# Patient Record
Sex: FEMALE | Race: WHITE | Employment: OTHER | URBAN - METROPOLITAN AREA
[De-identification: names, ages, dates, MRNs, and addresses within clinical notes are randomized per-mention and may not be internally consistent; named-entity substitution may affect disease eponyms.]

---

## 2017-01-19 DIAGNOSIS — I10 ESSENTIAL HYPERTENSION, BENIGN: ICD-10-CM

## 2017-01-19 RX ORDER — FUROSEMIDE 20 MG/1
TABLET ORAL
Qty: 30 TABLET | Refills: 5 | Status: SHIPPED | OUTPATIENT
Start: 2017-01-19 | End: 2017-09-12 | Stop reason: SDUPTHER

## 2017-02-01 ENCOUNTER — OFFICE VISIT (OUTPATIENT)
Dept: CARDIOLOGY CLINIC | Age: 77
End: 2017-02-01

## 2017-02-01 VITALS
SYSTOLIC BLOOD PRESSURE: 134 MMHG | WEIGHT: 81 LBS | DIASTOLIC BLOOD PRESSURE: 62 MMHG | HEIGHT: 64 IN | BODY MASS INDEX: 13.83 KG/M2 | HEART RATE: 64 BPM

## 2017-02-01 DIAGNOSIS — Z86.79 HX OF CARDIOMYOPATHY: ICD-10-CM

## 2017-02-01 DIAGNOSIS — I10 ESSENTIAL HYPERTENSION, BENIGN: ICD-10-CM

## 2017-02-01 DIAGNOSIS — I73.9 PAD (PERIPHERAL ARTERY DISEASE) (HCC): Primary | ICD-10-CM

## 2017-02-01 DIAGNOSIS — I70.1 BILATERAL RENAL ARTERY STENOSIS (HCC): ICD-10-CM

## 2017-02-01 DIAGNOSIS — R09.89 BILATERAL CAROTID BRUITS: ICD-10-CM

## 2017-02-01 PROCEDURE — 1090F PRES/ABSN URINE INCON ASSESS: CPT | Performed by: INTERNAL MEDICINE

## 2017-02-01 PROCEDURE — G8427 DOCREV CUR MEDS BY ELIG CLIN: HCPCS | Performed by: INTERNAL MEDICINE

## 2017-02-01 PROCEDURE — G8400 PT W/DXA NO RESULTS DOC: HCPCS | Performed by: INTERNAL MEDICINE

## 2017-02-01 PROCEDURE — G8419 CALC BMI OUT NRM PARAM NOF/U: HCPCS | Performed by: INTERNAL MEDICINE

## 2017-02-01 PROCEDURE — 4040F PNEUMOC VAC/ADMIN/RCVD: CPT | Performed by: INTERNAL MEDICINE

## 2017-02-01 PROCEDURE — G8598 ASA/ANTIPLAT THER USED: HCPCS | Performed by: INTERNAL MEDICINE

## 2017-02-01 PROCEDURE — 1123F ACP DISCUSS/DSCN MKR DOCD: CPT | Performed by: INTERNAL MEDICINE

## 2017-02-01 PROCEDURE — G8484 FLU IMMUNIZE NO ADMIN: HCPCS | Performed by: INTERNAL MEDICINE

## 2017-02-01 PROCEDURE — 99214 OFFICE O/P EST MOD 30 MIN: CPT | Performed by: INTERNAL MEDICINE

## 2017-02-01 PROCEDURE — 3017F COLORECTAL CA SCREEN DOC REV: CPT | Performed by: INTERNAL MEDICINE

## 2017-02-01 PROCEDURE — 1036F TOBACCO NON-USER: CPT | Performed by: INTERNAL MEDICINE

## 2017-02-01 PROCEDURE — 3014F SCREEN MAMMO DOC REV: CPT | Performed by: INTERNAL MEDICINE

## 2017-02-16 ENCOUNTER — OFFICE VISIT (OUTPATIENT)
Dept: FAMILY MEDICINE CLINIC | Age: 77
End: 2017-02-16

## 2017-02-16 VITALS
HEART RATE: 63 BPM | HEIGHT: 64 IN | DIASTOLIC BLOOD PRESSURE: 63 MMHG | SYSTOLIC BLOOD PRESSURE: 134 MMHG | BODY MASS INDEX: 13.66 KG/M2 | WEIGHT: 80 LBS

## 2017-02-16 DIAGNOSIS — R63.4 WEIGHT LOSS: ICD-10-CM

## 2017-02-16 DIAGNOSIS — I25.10 CORONARY ARTERY DISEASE INVOLVING NATIVE CORONARY ARTERY OF NATIVE HEART WITHOUT ANGINA PECTORIS: ICD-10-CM

## 2017-02-16 DIAGNOSIS — I25.5 ISCHEMIC CARDIOMYOPATHY: ICD-10-CM

## 2017-02-16 DIAGNOSIS — I10 ESSENTIAL HYPERTENSION, BENIGN: ICD-10-CM

## 2017-02-16 DIAGNOSIS — J42 CHRONIC BRONCHITIS, UNSPECIFIED CHRONIC BRONCHITIS TYPE (HCC): ICD-10-CM

## 2017-02-16 DIAGNOSIS — C34.12 MALIGNANT NEOPLASM OF UPPER LOBE OF LEFT LUNG (HCC): Primary | ICD-10-CM

## 2017-02-16 DIAGNOSIS — I73.9 PAD (PERIPHERAL ARTERY DISEASE) (HCC): ICD-10-CM

## 2017-02-16 LAB
A/G RATIO: 2 (ref 1.1–2.2)
ALBUMIN SERPL-MCNC: 4.1 G/DL (ref 3.4–5)
ALP BLD-CCNC: 65 U/L (ref 40–129)
ALT SERPL-CCNC: 36 U/L (ref 10–40)
ANION GAP SERPL CALCULATED.3IONS-SCNC: 14 MMOL/L (ref 3–16)
AST SERPL-CCNC: 37 U/L (ref 15–37)
BASOPHILS ABSOLUTE: 0 K/UL (ref 0–0.2)
BASOPHILS RELATIVE PERCENT: 0.6 %
BILIRUB SERPL-MCNC: 0.4 MG/DL (ref 0–1)
BUN BLDV-MCNC: 20 MG/DL (ref 7–20)
CALCIUM SERPL-MCNC: 9 MG/DL (ref 8.3–10.6)
CHLORIDE BLD-SCNC: 102 MMOL/L (ref 99–110)
CHOLESTEROL, TOTAL: 121 MG/DL (ref 0–199)
CO2: 26 MMOL/L (ref 21–32)
CREAT SERPL-MCNC: 0.9 MG/DL (ref 0.6–1.2)
EOSINOPHILS ABSOLUTE: 0.1 K/UL (ref 0–0.6)
EOSINOPHILS RELATIVE PERCENT: 1.6 %
GFR AFRICAN AMERICAN: >60
GFR NON-AFRICAN AMERICAN: >60
GLOBULIN: 2.1 G/DL
GLUCOSE BLD-MCNC: 87 MG/DL (ref 70–99)
HCT VFR BLD CALC: 34.1 % (ref 36–48)
HDLC SERPL-MCNC: 59 MG/DL (ref 40–60)
HEMOGLOBIN: 11 G/DL (ref 12–16)
LDL CHOLESTEROL CALCULATED: 53 MG/DL
LYMPHOCYTES ABSOLUTE: 0.8 K/UL (ref 1–5.1)
LYMPHOCYTES RELATIVE PERCENT: 13.1 %
MCH RBC QN AUTO: 30.3 PG (ref 26–34)
MCHC RBC AUTO-ENTMCNC: 32.4 G/DL (ref 31–36)
MCV RBC AUTO: 93.6 FL (ref 80–100)
MONOCYTES ABSOLUTE: 0.5 K/UL (ref 0–1.3)
MONOCYTES RELATIVE PERCENT: 8.3 %
NEUTROPHILS ABSOLUTE: 4.5 K/UL (ref 1.7–7.7)
NEUTROPHILS RELATIVE PERCENT: 76.4 %
PDW BLD-RTO: 15.8 % (ref 12.4–15.4)
PLATELET # BLD: 199 K/UL (ref 135–450)
PMV BLD AUTO: 7.3 FL (ref 5–10.5)
POTASSIUM SERPL-SCNC: 3.9 MMOL/L (ref 3.5–5.1)
RBC # BLD: 3.64 M/UL (ref 4–5.2)
SODIUM BLD-SCNC: 142 MMOL/L (ref 136–145)
TOTAL PROTEIN: 6.2 G/DL (ref 6.4–8.2)
TRIGL SERPL-MCNC: 46 MG/DL (ref 0–150)
VLDLC SERPL CALC-MCNC: 9 MG/DL
WBC # BLD: 5.9 K/UL (ref 4–11)

## 2017-02-16 PROCEDURE — G8400 PT W/DXA NO RESULTS DOC: HCPCS | Performed by: FAMILY MEDICINE

## 2017-02-16 PROCEDURE — G8926 SPIRO NO PERF OR DOC: HCPCS | Performed by: FAMILY MEDICINE

## 2017-02-16 PROCEDURE — 3014F SCREEN MAMMO DOC REV: CPT | Performed by: FAMILY MEDICINE

## 2017-02-16 PROCEDURE — G8419 CALC BMI OUT NRM PARAM NOF/U: HCPCS | Performed by: FAMILY MEDICINE

## 2017-02-16 PROCEDURE — G8427 DOCREV CUR MEDS BY ELIG CLIN: HCPCS | Performed by: FAMILY MEDICINE

## 2017-02-16 PROCEDURE — 3017F COLORECTAL CA SCREEN DOC REV: CPT | Performed by: FAMILY MEDICINE

## 2017-02-16 PROCEDURE — 1090F PRES/ABSN URINE INCON ASSESS: CPT | Performed by: FAMILY MEDICINE

## 2017-02-16 PROCEDURE — 1036F TOBACCO NON-USER: CPT | Performed by: FAMILY MEDICINE

## 2017-02-16 PROCEDURE — G8598 ASA/ANTIPLAT THER USED: HCPCS | Performed by: FAMILY MEDICINE

## 2017-02-16 PROCEDURE — 99215 OFFICE O/P EST HI 40 MIN: CPT | Performed by: FAMILY MEDICINE

## 2017-02-16 PROCEDURE — 3023F SPIROM DOC REV: CPT | Performed by: FAMILY MEDICINE

## 2017-02-16 PROCEDURE — 36415 COLL VENOUS BLD VENIPUNCTURE: CPT | Performed by: FAMILY MEDICINE

## 2017-02-16 PROCEDURE — 4040F PNEUMOC VAC/ADMIN/RCVD: CPT | Performed by: FAMILY MEDICINE

## 2017-02-16 PROCEDURE — G8484 FLU IMMUNIZE NO ADMIN: HCPCS | Performed by: FAMILY MEDICINE

## 2017-02-16 PROCEDURE — 1123F ACP DISCUSS/DSCN MKR DOCD: CPT | Performed by: FAMILY MEDICINE

## 2017-02-17 ENCOUNTER — TELEPHONE (OUTPATIENT)
Dept: CARDIOLOGY CLINIC | Age: 77
End: 2017-02-17

## 2017-03-06 ENCOUNTER — HOSPITAL ENCOUNTER (OUTPATIENT)
Dept: CT IMAGING | Age: 77
Discharge: HOME | End: 2017-03-06
Attending: INTERNAL MEDICINE | Admitting: INTERNAL MEDICINE

## 2017-03-06 DIAGNOSIS — C34.12 MALIGNANT NEOPLASM OF UPPER LOBE, LEFT BRONCHUS OR LUNG (HCC): ICD-10-CM

## 2017-03-06 DIAGNOSIS — C34.12 MALIGNANT NEOPLASM OF UPPER LOBE OF LEFT LUNG (HCC): ICD-10-CM

## 2017-03-20 RX ORDER — CLOPIDOGREL BISULFATE 75 MG/1
TABLET ORAL
Qty: 30 TABLET | Refills: 5 | Status: SHIPPED | OUTPATIENT
Start: 2017-03-20 | End: 2017-09-12 | Stop reason: SDUPTHER

## 2017-05-16 ENCOUNTER — OFFICE VISIT (OUTPATIENT)
Dept: FAMILY MEDICINE CLINIC | Age: 77
End: 2017-05-16

## 2017-05-16 VITALS
WEIGHT: 80.4 LBS | BODY MASS INDEX: 14.25 KG/M2 | SYSTOLIC BLOOD PRESSURE: 110 MMHG | HEIGHT: 63 IN | RESPIRATION RATE: 20 BRPM | HEART RATE: 68 BPM | DIASTOLIC BLOOD PRESSURE: 52 MMHG

## 2017-05-16 DIAGNOSIS — H35.62 MACULAR HEMORRHAGE OF LEFT EYE: ICD-10-CM

## 2017-05-16 DIAGNOSIS — I10 ESSENTIAL HYPERTENSION, BENIGN: ICD-10-CM

## 2017-05-16 DIAGNOSIS — J42 CHRONIC BRONCHITIS, UNSPECIFIED CHRONIC BRONCHITIS TYPE (HCC): ICD-10-CM

## 2017-05-16 DIAGNOSIS — C34.12 MALIGNANT NEOPLASM OF UPPER LOBE OF LEFT LUNG (HCC): Primary | ICD-10-CM

## 2017-05-16 DIAGNOSIS — I73.9 PERIPHERAL VASCULAR DISEASE (HCC): ICD-10-CM

## 2017-05-16 DIAGNOSIS — I25.10 CORONARY ARTERY DISEASE INVOLVING NATIVE CORONARY ARTERY OF NATIVE HEART WITHOUT ANGINA PECTORIS: ICD-10-CM

## 2017-05-16 PROCEDURE — 4040F PNEUMOC VAC/ADMIN/RCVD: CPT | Performed by: FAMILY MEDICINE

## 2017-05-16 PROCEDURE — 1123F ACP DISCUSS/DSCN MKR DOCD: CPT | Performed by: FAMILY MEDICINE

## 2017-05-16 PROCEDURE — 3023F SPIROM DOC REV: CPT | Performed by: FAMILY MEDICINE

## 2017-05-16 PROCEDURE — 3017F COLORECTAL CA SCREEN DOC REV: CPT | Performed by: FAMILY MEDICINE

## 2017-05-16 PROCEDURE — G8427 DOCREV CUR MEDS BY ELIG CLIN: HCPCS | Performed by: FAMILY MEDICINE

## 2017-05-16 PROCEDURE — 3014F SCREEN MAMMO DOC REV: CPT | Performed by: FAMILY MEDICINE

## 2017-05-16 PROCEDURE — G8598 ASA/ANTIPLAT THER USED: HCPCS | Performed by: FAMILY MEDICINE

## 2017-05-16 PROCEDURE — 1036F TOBACCO NON-USER: CPT | Performed by: FAMILY MEDICINE

## 2017-05-16 PROCEDURE — 1090F PRES/ABSN URINE INCON ASSESS: CPT | Performed by: FAMILY MEDICINE

## 2017-05-16 PROCEDURE — G8926 SPIRO NO PERF OR DOC: HCPCS | Performed by: FAMILY MEDICINE

## 2017-05-16 PROCEDURE — 99215 OFFICE O/P EST HI 40 MIN: CPT | Performed by: FAMILY MEDICINE

## 2017-05-16 PROCEDURE — G8419 CALC BMI OUT NRM PARAM NOF/U: HCPCS | Performed by: FAMILY MEDICINE

## 2017-05-16 PROCEDURE — G8400 PT W/DXA NO RESULTS DOC: HCPCS | Performed by: FAMILY MEDICINE

## 2017-06-05 ENCOUNTER — HOSPITAL ENCOUNTER (OUTPATIENT)
Dept: CT IMAGING | Age: 77
Discharge: HOME | End: 2017-06-05
Attending: INTERNAL MEDICINE | Admitting: INTERNAL MEDICINE

## 2017-06-05 DIAGNOSIS — C34.12 MALIGNANT NEOPLASM OF UPPER LOBE, LEFT BRONCHUS OR LUNG (HCC): ICD-10-CM

## 2017-06-05 DIAGNOSIS — C34.12 MALIGNANT NEOPLASM OF UPPER LOBE OF LEFT LUNG (HCC): ICD-10-CM

## 2017-06-05 LAB
GFR AFRICAN AMERICAN: >60
GFR NON-AFRICAN AMERICAN: >60
PERFORMED ON: NORMAL
POC CREATININE: 0.9 MG/DL (ref 0.6–1.2)
POC SAMPLE TYPE: NORMAL

## 2017-06-08 RX ORDER — POTASSIUM CHLORIDE 20 MEQ/1
TABLET, EXTENDED RELEASE ORAL
Qty: 90 TABLET | Refills: 3 | Status: SHIPPED | OUTPATIENT
Start: 2017-06-08 | End: 2017-09-12 | Stop reason: SDUPTHER

## 2017-06-20 ENCOUNTER — OFFICE VISIT (OUTPATIENT)
Dept: FAMILY MEDICINE CLINIC | Age: 77
End: 2017-06-20

## 2017-06-20 VITALS
DIASTOLIC BLOOD PRESSURE: 60 MMHG | HEART RATE: 74 BPM | HEIGHT: 63 IN | WEIGHT: 79 LBS | SYSTOLIC BLOOD PRESSURE: 172 MMHG | BODY MASS INDEX: 14 KG/M2

## 2017-06-20 DIAGNOSIS — I10 ESSENTIAL HYPERTENSION, BENIGN: ICD-10-CM

## 2017-06-20 DIAGNOSIS — C34.12 MALIGNANT NEOPLASM OF UPPER LOBE OF LEFT LUNG (HCC): ICD-10-CM

## 2017-06-20 DIAGNOSIS — R53.1 WEAKNESS: ICD-10-CM

## 2017-06-20 DIAGNOSIS — S30.1XXA HEMATOMA OF LEFT FLANK, INITIAL ENCOUNTER: Primary | ICD-10-CM
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
## 2017-06-20 LAB
BASOPHILS ABSOLUTE: 0 K/UL (ref 0–0.2)
BASOPHILS RELATIVE PERCENT: 0.4 %
EOSINOPHILS ABSOLUTE: 0.1 K/UL (ref 0–0.6)
EOSINOPHILS RELATIVE PERCENT: 1.4 %
HCT VFR BLD CALC: 30.3 % (ref 36–48)
HEMOGLOBIN: 10 G/DL (ref 12–16)
LYMPHOCYTES ABSOLUTE: 1.1 K/UL (ref 1–5.1)
LYMPHOCYTES RELATIVE PERCENT: 11.8 %
MCH RBC QN AUTO: 29 PG (ref 26–34)
MCHC RBC AUTO-ENTMCNC: 32.8 G/DL (ref 31–36)
MCV RBC AUTO: 88.4 FL (ref 80–100)
MONOCYTES ABSOLUTE: 0.9 K/UL (ref 0–1.3)
MONOCYTES RELATIVE PERCENT: 10.3 %
NEUTROPHILS ABSOLUTE: 6.9 K/UL (ref 1.7–7.7)
NEUTROPHILS RELATIVE PERCENT: 76.1 %
PDW BLD-RTO: 15.5 % (ref 12.4–15.4)
PLATELET # BLD: 229 K/UL (ref 135–450)
PMV BLD AUTO: 7.1 FL (ref 5–10.5)
RBC # BLD: 3.43 M/UL (ref 4–5.2)
WBC # BLD: 9 K/UL (ref 4–11)

## 2017-06-20 PROCEDURE — 1036F TOBACCO NON-USER: CPT | Performed by: FAMILY MEDICINE

## 2017-06-20 PROCEDURE — 3014F SCREEN MAMMO DOC REV: CPT | Performed by: FAMILY MEDICINE

## 2017-06-20 PROCEDURE — G8598 ASA/ANTIPLAT THER USED: HCPCS | Performed by: FAMILY MEDICINE

## 2017-06-20 PROCEDURE — 36415 COLL VENOUS BLD VENIPUNCTURE: CPT | Performed by: FAMILY MEDICINE

## 2017-06-20 PROCEDURE — 3017F COLORECTAL CA SCREEN DOC REV: CPT | Performed by: FAMILY MEDICINE

## 2017-06-20 PROCEDURE — G8427 DOCREV CUR MEDS BY ELIG CLIN: HCPCS | Performed by: FAMILY MEDICINE

## 2017-06-20 PROCEDURE — 1123F ACP DISCUSS/DSCN MKR DOCD: CPT | Performed by: FAMILY MEDICINE

## 2017-06-20 PROCEDURE — 4040F PNEUMOC VAC/ADMIN/RCVD: CPT | Performed by: FAMILY MEDICINE

## 2017-06-20 PROCEDURE — G8400 PT W/DXA NO RESULTS DOC: HCPCS | Performed by: FAMILY MEDICINE

## 2017-06-20 PROCEDURE — 1090F PRES/ABSN URINE INCON ASSESS: CPT | Performed by: FAMILY MEDICINE

## 2017-06-20 PROCEDURE — G8419 CALC BMI OUT NRM PARAM NOF/U: HCPCS | Performed by: FAMILY MEDICINE

## 2017-06-20 PROCEDURE — 99214 OFFICE O/P EST MOD 30 MIN: CPT | Performed by: FAMILY MEDICINE

## 2017-06-20 RX ORDER — HYDROCODONE BITARTRATE AND ACETAMINOPHEN 5; 325 MG/1; MG/1
1 TABLET ORAL EVERY 4 HOURS PRN
Qty: 30 TABLET | Refills: 0 | Status: SHIPPED | OUTPATIENT
Start: 2017-06-20 | End: 2017-06-27

## 2017-06-21 ENCOUNTER — TELEPHONE (OUTPATIENT)
Dept: FAMILY MEDICINE CLINIC | Age: 77
End: 2017-06-21

## 2017-06-21 RX ORDER — ONDANSETRON 4 MG/1
4 TABLET, FILM COATED ORAL EVERY 8 HOURS PRN
Qty: 20 TABLET | Refills: 0 | Status: CANCELLED | OUTPATIENT
Start: 2017-06-21 | End: 2018-06-21

## 2017-06-27 ENCOUNTER — OFFICE VISIT (OUTPATIENT)
Dept: FAMILY MEDICINE CLINIC | Age: 77
End: 2017-06-27

## 2017-06-27 VITALS
BODY MASS INDEX: 14 KG/M2 | HEART RATE: 68 BPM | SYSTOLIC BLOOD PRESSURE: 93 MMHG | HEIGHT: 63 IN | WEIGHT: 79 LBS | DIASTOLIC BLOOD PRESSURE: 51 MMHG

## 2017-06-27 DIAGNOSIS — I25.10 CORONARY ARTERY DISEASE INVOLVING NATIVE CORONARY ARTERY OF NATIVE HEART WITHOUT ANGINA PECTORIS: ICD-10-CM

## 2017-06-27 DIAGNOSIS — J42 CHRONIC BRONCHITIS, UNSPECIFIED CHRONIC BRONCHITIS TYPE (HCC): ICD-10-CM

## 2017-06-27 DIAGNOSIS — S30.1XXA HEMATOMA OF LEFT FLANK, INITIAL ENCOUNTER: Primary | ICD-10-CM

## 2017-06-27 DIAGNOSIS — I73.9 PERIPHERAL VASCULAR DISEASE (HCC): ICD-10-CM

## 2017-06-27 PROCEDURE — 99214 OFFICE O/P EST MOD 30 MIN: CPT | Performed by: FAMILY MEDICINE

## 2017-06-27 PROCEDURE — G8427 DOCREV CUR MEDS BY ELIG CLIN: HCPCS | Performed by: FAMILY MEDICINE

## 2017-06-27 PROCEDURE — 1036F TOBACCO NON-USER: CPT | Performed by: FAMILY MEDICINE

## 2017-06-27 PROCEDURE — 4040F PNEUMOC VAC/ADMIN/RCVD: CPT | Performed by: FAMILY MEDICINE

## 2017-06-27 PROCEDURE — G8598 ASA/ANTIPLAT THER USED: HCPCS | Performed by: FAMILY MEDICINE

## 2017-06-27 PROCEDURE — 3017F COLORECTAL CA SCREEN DOC REV: CPT | Performed by: FAMILY MEDICINE

## 2017-06-27 PROCEDURE — G8419 CALC BMI OUT NRM PARAM NOF/U: HCPCS | Performed by: FAMILY MEDICINE

## 2017-06-27 PROCEDURE — G8400 PT W/DXA NO RESULTS DOC: HCPCS | Performed by: FAMILY MEDICINE

## 2017-06-27 PROCEDURE — 1090F PRES/ABSN URINE INCON ASSESS: CPT | Performed by: FAMILY MEDICINE

## 2017-06-27 PROCEDURE — 3014F SCREEN MAMMO DOC REV: CPT | Performed by: FAMILY MEDICINE

## 2017-06-27 PROCEDURE — 1123F ACP DISCUSS/DSCN MKR DOCD: CPT | Performed by: FAMILY MEDICINE

## 2017-06-27 PROCEDURE — 3023F SPIROM DOC REV: CPT | Performed by: FAMILY MEDICINE

## 2017-06-27 PROCEDURE — G8926 SPIRO NO PERF OR DOC: HCPCS | Performed by: FAMILY MEDICINE

## 2017-07-13 ENCOUNTER — CARE COORDINATION (OUTPATIENT)
Dept: CARE COORDINATION | Age: 77
End: 2017-07-13

## 2017-07-14 ENCOUNTER — OFFICE VISIT (OUTPATIENT)
Dept: FAMILY MEDICINE CLINIC | Age: 77
End: 2017-07-14

## 2017-07-14 VITALS
HEART RATE: 72 BPM | OXYGEN SATURATION: 82 % | HEIGHT: 61 IN | SYSTOLIC BLOOD PRESSURE: 136 MMHG | RESPIRATION RATE: 14 BRPM | DIASTOLIC BLOOD PRESSURE: 50 MMHG | TEMPERATURE: 98.7 F | WEIGHT: 77 LBS | BODY MASS INDEX: 14.54 KG/M2

## 2017-07-14 DIAGNOSIS — R26.81 UNSTEADY GAIT: ICD-10-CM

## 2017-07-14 DIAGNOSIS — J96.11 CHRONIC RESPIRATORY FAILURE WITH HYPOXIA (HCC): ICD-10-CM

## 2017-07-14 DIAGNOSIS — R29.898 WEAKNESS OF RIGHT LEG: ICD-10-CM

## 2017-07-14 DIAGNOSIS — J18.9 PNEUMONIA OF LEFT UPPER LOBE DUE TO INFECTIOUS ORGANISM: Primary | ICD-10-CM

## 2017-07-14 DIAGNOSIS — R29.898 WEAKNESS OF LEFT LEG: ICD-10-CM

## 2017-07-14 DIAGNOSIS — C34.92 SMALL CELL LUNG CANCER, LEFT (HCC): ICD-10-CM

## 2017-07-14 DIAGNOSIS — J42 CHRONIC BRONCHITIS, UNSPECIFIED CHRONIC BRONCHITIS TYPE (HCC): ICD-10-CM

## 2017-07-14 PROCEDURE — 4040F PNEUMOC VAC/ADMIN/RCVD: CPT | Performed by: FAMILY MEDICINE

## 2017-07-14 PROCEDURE — 3017F COLORECTAL CA SCREEN DOC REV: CPT | Performed by: FAMILY MEDICINE

## 2017-07-14 PROCEDURE — G8598 ASA/ANTIPLAT THER USED: HCPCS | Performed by: FAMILY MEDICINE

## 2017-07-14 PROCEDURE — G8427 DOCREV CUR MEDS BY ELIG CLIN: HCPCS | Performed by: FAMILY MEDICINE

## 2017-07-14 PROCEDURE — 3014F SCREEN MAMMO DOC REV: CPT | Performed by: FAMILY MEDICINE

## 2017-07-14 PROCEDURE — G8926 SPIRO NO PERF OR DOC: HCPCS | Performed by: FAMILY MEDICINE

## 2017-07-14 PROCEDURE — 1036F TOBACCO NON-USER: CPT | Performed by: FAMILY MEDICINE

## 2017-07-14 PROCEDURE — G8400 PT W/DXA NO RESULTS DOC: HCPCS | Performed by: FAMILY MEDICINE

## 2017-07-14 PROCEDURE — G8419 CALC BMI OUT NRM PARAM NOF/U: HCPCS | Performed by: FAMILY MEDICINE

## 2017-07-14 PROCEDURE — 3023F SPIROM DOC REV: CPT | Performed by: FAMILY MEDICINE

## 2017-07-14 PROCEDURE — 1090F PRES/ABSN URINE INCON ASSESS: CPT | Performed by: FAMILY MEDICINE

## 2017-07-14 PROCEDURE — 99214 OFFICE O/P EST MOD 30 MIN: CPT | Performed by: FAMILY MEDICINE

## 2017-07-14 PROCEDURE — 1123F ACP DISCUSS/DSCN MKR DOCD: CPT | Performed by: FAMILY MEDICINE

## 2017-07-14 ASSESSMENT — PATIENT HEALTH QUESTIONNAIRE - PHQ9
1. LITTLE INTEREST OR PLEASURE IN DOING THINGS: 0
SUM OF ALL RESPONSES TO PHQ9 QUESTIONS 1 & 2: 1
2. FEELING DOWN, DEPRESSED OR HOPELESS: 1
SUM OF ALL RESPONSES TO PHQ QUESTIONS 1-9: 1

## 2017-07-17 ENCOUNTER — CARE COORDINATION (OUTPATIENT)
Dept: CARE COORDINATION | Age: 77
End: 2017-07-17

## 2017-07-17 ENCOUNTER — TELEPHONE (OUTPATIENT)
Dept: FAMILY MEDICINE CLINIC | Age: 77
End: 2017-07-17

## 2017-07-18 DIAGNOSIS — I25.10 CAD (CORONARY ARTERY DISEASE): ICD-10-CM

## 2017-07-18 DIAGNOSIS — I73.9 PERIPHERAL VASCULAR DISEASE (HCC): ICD-10-CM

## 2017-07-18 DIAGNOSIS — I10 HTN (HYPERTENSION): ICD-10-CM

## 2017-07-18 PROBLEM — R09.02 HYPOXIA: Status: ACTIVE | Noted: 2017-07-18

## 2017-07-19 ENCOUNTER — TELEPHONE (OUTPATIENT)
Dept: FAMILY MEDICINE CLINIC | Age: 77
End: 2017-07-19

## 2017-07-19 ENCOUNTER — CARE COORDINATION (OUTPATIENT)
Dept: CASE MANAGEMENT | Age: 77
End: 2017-07-19

## 2017-07-19 RX ORDER — CILOSTAZOL 50 MG/1
TABLET ORAL
Qty: 180 TABLET | Refills: 3 | Status: SHIPPED | OUTPATIENT
Start: 2017-07-19 | End: 2017-08-10 | Stop reason: HOSPADM

## 2017-07-19 RX ORDER — CARVEDILOL 25 MG/1
TABLET ORAL
Qty: 180 TABLET | Refills: 3 | Status: SHIPPED | OUTPATIENT
Start: 2017-07-19 | End: 2017-09-12 | Stop reason: SDUPTHER

## 2017-07-24 ENCOUNTER — CARE COORDINATION (OUTPATIENT)
Dept: CASE MANAGEMENT | Age: 77
End: 2017-07-24

## 2017-07-25 ENCOUNTER — OFFICE VISIT (OUTPATIENT)
Dept: FAMILY MEDICINE CLINIC | Age: 77
End: 2017-07-25

## 2017-07-25 VITALS
HEART RATE: 78 BPM | WEIGHT: 82.4 LBS | HEIGHT: 61 IN | DIASTOLIC BLOOD PRESSURE: 60 MMHG | OXYGEN SATURATION: 90 % | SYSTOLIC BLOOD PRESSURE: 106 MMHG | BODY MASS INDEX: 15.56 KG/M2

## 2017-07-25 DIAGNOSIS — J18.9 PNEUMONIA DUE TO ORGANISM: ICD-10-CM

## 2017-07-25 DIAGNOSIS — I73.9 PERIPHERAL VASCULAR DISEASE (HCC): ICD-10-CM

## 2017-07-25 DIAGNOSIS — C34.12 MALIGNANT NEOPLASM OF UPPER LOBE OF LEFT LUNG (HCC): Primary | ICD-10-CM

## 2017-07-25 DIAGNOSIS — I10 ESSENTIAL HYPERTENSION, BENIGN: ICD-10-CM

## 2017-07-25 PROCEDURE — 4040F PNEUMOC VAC/ADMIN/RCVD: CPT | Performed by: FAMILY MEDICINE

## 2017-07-25 PROCEDURE — 1123F ACP DISCUSS/DSCN MKR DOCD: CPT | Performed by: FAMILY MEDICINE

## 2017-07-25 PROCEDURE — G8598 ASA/ANTIPLAT THER USED: HCPCS | Performed by: FAMILY MEDICINE

## 2017-07-25 PROCEDURE — G8427 DOCREV CUR MEDS BY ELIG CLIN: HCPCS | Performed by: FAMILY MEDICINE

## 2017-07-25 PROCEDURE — 1090F PRES/ABSN URINE INCON ASSESS: CPT | Performed by: FAMILY MEDICINE

## 2017-07-25 PROCEDURE — 3017F COLORECTAL CA SCREEN DOC REV: CPT | Performed by: FAMILY MEDICINE

## 2017-07-25 PROCEDURE — G8419 CALC BMI OUT NRM PARAM NOF/U: HCPCS | Performed by: FAMILY MEDICINE

## 2017-07-25 PROCEDURE — 99215 OFFICE O/P EST HI 40 MIN: CPT | Performed by: FAMILY MEDICINE

## 2017-07-25 PROCEDURE — 3014F SCREEN MAMMO DOC REV: CPT | Performed by: FAMILY MEDICINE

## 2017-07-25 PROCEDURE — 1036F TOBACCO NON-USER: CPT | Performed by: FAMILY MEDICINE

## 2017-07-25 PROCEDURE — G8400 PT W/DXA NO RESULTS DOC: HCPCS | Performed by: FAMILY MEDICINE

## 2017-07-25 PROCEDURE — 1111F DSCHRG MED/CURRENT MED MERGE: CPT | Performed by: FAMILY MEDICINE

## 2017-07-26 ENCOUNTER — CARE COORDINATION (OUTPATIENT)
Dept: CARE COORDINATION | Age: 77
End: 2017-07-26

## 2017-07-26 PROBLEM — R26.81 UNSTEADY GAIT: Status: ACTIVE | Noted: 2017-07-26

## 2017-07-26 ASSESSMENT — ENCOUNTER SYMPTOMS: DYSPNEA ASSOCIATED WITH: MINIMAL EXERTION

## 2017-07-27 ENCOUNTER — CARE COORDINATION (OUTPATIENT)
Dept: CASE MANAGEMENT | Age: 77
End: 2017-07-27

## 2017-07-28 ENCOUNTER — OFFICE VISIT (OUTPATIENT)
Dept: PULMONOLOGY | Age: 77
End: 2017-07-28

## 2017-07-28 VITALS
DIASTOLIC BLOOD PRESSURE: 62 MMHG | SYSTOLIC BLOOD PRESSURE: 118 MMHG | HEIGHT: 61 IN | BODY MASS INDEX: 15.48 KG/M2 | WEIGHT: 82 LBS | RESPIRATION RATE: 20 BRPM | TEMPERATURE: 98.2 F

## 2017-07-28 DIAGNOSIS — J44.9 COPD, MODERATE (HCC): Primary | ICD-10-CM

## 2017-07-28 DIAGNOSIS — R09.02 HYPOXIA: ICD-10-CM

## 2017-07-28 PROCEDURE — G8400 PT W/DXA NO RESULTS DOC: HCPCS | Performed by: INTERNAL MEDICINE

## 2017-07-28 PROCEDURE — G8427 DOCREV CUR MEDS BY ELIG CLIN: HCPCS | Performed by: INTERNAL MEDICINE

## 2017-07-28 PROCEDURE — 1090F PRES/ABSN URINE INCON ASSESS: CPT | Performed by: INTERNAL MEDICINE

## 2017-07-28 PROCEDURE — G8419 CALC BMI OUT NRM PARAM NOF/U: HCPCS | Performed by: INTERNAL MEDICINE

## 2017-07-28 PROCEDURE — 3014F SCREEN MAMMO DOC REV: CPT | Performed by: INTERNAL MEDICINE

## 2017-07-28 PROCEDURE — 3023F SPIROM DOC REV: CPT | Performed by: INTERNAL MEDICINE

## 2017-07-28 PROCEDURE — 1111F DSCHRG MED/CURRENT MED MERGE: CPT | Performed by: INTERNAL MEDICINE

## 2017-07-28 PROCEDURE — 1123F ACP DISCUSS/DSCN MKR DOCD: CPT | Performed by: INTERNAL MEDICINE

## 2017-07-28 PROCEDURE — G8926 SPIRO NO PERF OR DOC: HCPCS | Performed by: INTERNAL MEDICINE

## 2017-07-28 PROCEDURE — 4040F PNEUMOC VAC/ADMIN/RCVD: CPT | Performed by: INTERNAL MEDICINE

## 2017-07-28 PROCEDURE — 1036F TOBACCO NON-USER: CPT | Performed by: INTERNAL MEDICINE

## 2017-07-28 PROCEDURE — 99213 OFFICE O/P EST LOW 20 MIN: CPT | Performed by: INTERNAL MEDICINE

## 2017-07-28 PROCEDURE — G8598 ASA/ANTIPLAT THER USED: HCPCS | Performed by: INTERNAL MEDICINE

## 2017-07-28 PROCEDURE — 3017F COLORECTAL CA SCREEN DOC REV: CPT | Performed by: INTERNAL MEDICINE

## 2017-08-04 ENCOUNTER — TELEPHONE (OUTPATIENT)
Dept: FAMILY MEDICINE CLINIC | Age: 77
End: 2017-08-04

## 2017-08-10 ENCOUNTER — TELEPHONE (OUTPATIENT)
Dept: CARDIOLOGY CLINIC | Age: 77
End: 2017-08-10

## 2017-08-10 ENCOUNTER — TELEPHONE (OUTPATIENT)
Dept: CASE MANAGEMENT | Age: 77
End: 2017-08-10

## 2017-08-10 ENCOUNTER — CARE COORDINATION (OUTPATIENT)
Dept: CASE MANAGEMENT | Age: 77
End: 2017-08-10

## 2017-08-10 ENCOUNTER — TELEPHONE (OUTPATIENT)
Dept: PHARMACY | Facility: CLINIC | Age: 77
End: 2017-08-10

## 2017-08-11 ENCOUNTER — CARE COORDINATION (OUTPATIENT)
Dept: CASE MANAGEMENT | Age: 77
End: 2017-08-11

## 2017-08-11 ENCOUNTER — TELEPHONE (OUTPATIENT)
Dept: FAMILY MEDICINE CLINIC | Age: 77
End: 2017-08-11

## 2017-08-11 RX ORDER — SENNA AND DOCUSATE SODIUM 50; 8.6 MG/1; MG/1
1 TABLET, FILM COATED ORAL 2 TIMES DAILY
Qty: 60 TABLET | Refills: 11 | Status: SHIPPED | OUTPATIENT
Start: 2017-08-11 | End: 2018-08-11

## 2017-08-14 ENCOUNTER — CARE COORDINATION (OUTPATIENT)
Dept: CASE MANAGEMENT | Age: 77
End: 2017-08-14

## 2017-08-14 ENCOUNTER — CARE COORDINATION (OUTPATIENT)
Dept: CARE COORDINATION | Age: 77
End: 2017-08-14

## 2017-08-15 ENCOUNTER — CARE COORDINATION (OUTPATIENT)
Dept: CASE MANAGEMENT | Age: 77
End: 2017-08-15

## 2017-08-15 ENCOUNTER — TELEPHONE (OUTPATIENT)
Dept: FAMILY MEDICINE CLINIC | Age: 77
End: 2017-08-15

## 2017-08-16 ENCOUNTER — TELEPHONE (OUTPATIENT)
Dept: FAMILY MEDICINE CLINIC | Age: 77
End: 2017-08-16

## 2017-08-17 ENCOUNTER — OFFICE VISIT (OUTPATIENT)
Dept: CARDIOLOGY CLINIC | Age: 77
End: 2017-08-17

## 2017-08-17 ENCOUNTER — CARE COORDINATION (OUTPATIENT)
Dept: CASE MANAGEMENT | Age: 77
End: 2017-08-17

## 2017-08-17 VITALS
DIASTOLIC BLOOD PRESSURE: 84 MMHG | WEIGHT: 75 LBS | SYSTOLIC BLOOD PRESSURE: 144 MMHG | HEIGHT: 61 IN | HEART RATE: 56 BPM | BODY MASS INDEX: 14.16 KG/M2 | OXYGEN SATURATION: 99 %

## 2017-08-17 DIAGNOSIS — I42.9 CARDIOMYOPATHY, UNSPECIFIED TYPE (HCC): Primary | ICD-10-CM

## 2017-08-17 DIAGNOSIS — N18.9 CKD (CHRONIC KIDNEY DISEASE), UNSPECIFIED STAGE: ICD-10-CM

## 2017-08-17 DIAGNOSIS — I73.9 PAD (PERIPHERAL ARTERY DISEASE) (HCC): ICD-10-CM

## 2017-08-17 DIAGNOSIS — R60.0 BILATERAL EDEMA OF LOWER EXTREMITY: ICD-10-CM

## 2017-08-17 DIAGNOSIS — I25.10 CORONARY ARTERY DISEASE INVOLVING NATIVE CORONARY ARTERY OF NATIVE HEART WITHOUT ANGINA PECTORIS: ICD-10-CM

## 2017-08-17 DIAGNOSIS — I10 ESSENTIAL HYPERTENSION, BENIGN: ICD-10-CM

## 2017-08-17 PROCEDURE — 1123F ACP DISCUSS/DSCN MKR DOCD: CPT | Performed by: INTERNAL MEDICINE

## 2017-08-17 PROCEDURE — G8400 PT W/DXA NO RESULTS DOC: HCPCS | Performed by: INTERNAL MEDICINE

## 2017-08-17 PROCEDURE — G8419 CALC BMI OUT NRM PARAM NOF/U: HCPCS | Performed by: INTERNAL MEDICINE

## 2017-08-17 PROCEDURE — 1090F PRES/ABSN URINE INCON ASSESS: CPT | Performed by: INTERNAL MEDICINE

## 2017-08-17 PROCEDURE — 93000 ELECTROCARDIOGRAM COMPLETE: CPT | Performed by: INTERNAL MEDICINE

## 2017-08-17 PROCEDURE — 3014F SCREEN MAMMO DOC REV: CPT | Performed by: INTERNAL MEDICINE

## 2017-08-17 PROCEDURE — G8598 ASA/ANTIPLAT THER USED: HCPCS | Performed by: INTERNAL MEDICINE

## 2017-08-17 PROCEDURE — 4040F PNEUMOC VAC/ADMIN/RCVD: CPT | Performed by: INTERNAL MEDICINE

## 2017-08-17 PROCEDURE — G8427 DOCREV CUR MEDS BY ELIG CLIN: HCPCS | Performed by: INTERNAL MEDICINE

## 2017-08-17 PROCEDURE — 99214 OFFICE O/P EST MOD 30 MIN: CPT | Performed by: INTERNAL MEDICINE

## 2017-08-17 PROCEDURE — 1111F DSCHRG MED/CURRENT MED MERGE: CPT | Performed by: INTERNAL MEDICINE

## 2017-08-17 PROCEDURE — 1036F TOBACCO NON-USER: CPT | Performed by: INTERNAL MEDICINE

## 2017-08-17 PROCEDURE — 3017F COLORECTAL CA SCREEN DOC REV: CPT | Performed by: INTERNAL MEDICINE

## 2017-08-21 ENCOUNTER — CARE COORDINATION (OUTPATIENT)
Dept: CASE MANAGEMENT | Age: 77
End: 2017-08-21

## 2017-08-23 ENCOUNTER — CARE COORDINATION (OUTPATIENT)
Dept: CASE MANAGEMENT | Age: 77
End: 2017-08-23

## 2017-08-28 RX ORDER — FLUTICASONE PROPIONATE AND SALMETEROL XINAFOATE 230; 21 UG/1; UG/1
AEROSOL, METERED RESPIRATORY (INHALATION)
Qty: 36 G | Refills: 3 | Status: SHIPPED | OUTPATIENT
Start: 2017-08-28 | End: 2017-09-12 | Stop reason: SDUPTHER

## 2017-08-31 ENCOUNTER — HOSPITAL ENCOUNTER (OUTPATIENT)
Dept: CT IMAGING | Age: 77
Discharge: HOME | End: 2017-08-31
Attending: INTERNAL MEDICINE | Admitting: INTERNAL MEDICINE

## 2017-08-31 DIAGNOSIS — C34.12 MALIGNANT NEOPLASM OF UPPER LOBE OF LEFT LUNG (HCC): ICD-10-CM

## 2017-08-31 DIAGNOSIS — C34.12 MALIGNANT NEOPLASM OF UPPER LOBE, LEFT BRONCHUS OR LUNG (HCC): ICD-10-CM

## 2017-09-05 ENCOUNTER — TELEPHONE (OUTPATIENT)
Dept: FAMILY MEDICINE CLINIC | Age: 77
End: 2017-09-05

## 2017-09-08 ENCOUNTER — TELEPHONE (OUTPATIENT)
Dept: SURGERY | Age: 77
End: 2017-09-08

## 2017-09-12 ENCOUNTER — OFFICE VISIT (OUTPATIENT)
Dept: FAMILY MEDICINE CLINIC | Age: 77
End: 2017-09-12

## 2017-09-12 ENCOUNTER — TELEPHONE (OUTPATIENT)
Dept: FAMILY MEDICINE CLINIC | Age: 77
End: 2017-09-12

## 2017-09-12 ENCOUNTER — CARE COORDINATOR VISIT (OUTPATIENT)
Dept: CARE COORDINATION | Age: 77
End: 2017-09-12

## 2017-09-12 VITALS
SYSTOLIC BLOOD PRESSURE: 110 MMHG | HEIGHT: 61 IN | DIASTOLIC BLOOD PRESSURE: 60 MMHG | HEART RATE: 63 BPM | BODY MASS INDEX: 14.16 KG/M2 | WEIGHT: 75 LBS

## 2017-09-12 DIAGNOSIS — I73.9 PAD (PERIPHERAL ARTERY DISEASE) (HCC): ICD-10-CM

## 2017-09-12 DIAGNOSIS — J43.2 CENTRILOBULAR EMPHYSEMA (HCC): ICD-10-CM

## 2017-09-12 DIAGNOSIS — Z85.118 PERSONAL HISTORY OF LUNG CANCER: ICD-10-CM

## 2017-09-12 DIAGNOSIS — I10 ESSENTIAL HYPERTENSION, BENIGN: Primary | ICD-10-CM

## 2017-09-12 DIAGNOSIS — I25.5 ISCHEMIC CARDIOMYOPATHY: ICD-10-CM

## 2017-09-12 DIAGNOSIS — Z23 NEEDS FLU SHOT: ICD-10-CM

## 2017-09-12 DIAGNOSIS — I25.10 CORONARY ARTERY DISEASE INVOLVING NATIVE CORONARY ARTERY OF NATIVE HEART WITHOUT ANGINA PECTORIS: ICD-10-CM

## 2017-09-12 PROCEDURE — 1090F PRES/ABSN URINE INCON ASSESS: CPT | Performed by: FAMILY MEDICINE

## 2017-09-12 PROCEDURE — 3023F SPIROM DOC REV: CPT | Performed by: FAMILY MEDICINE

## 2017-09-12 PROCEDURE — 90662 IIV NO PRSV INCREASED AG IM: CPT | Performed by: FAMILY MEDICINE

## 2017-09-12 PROCEDURE — 3017F COLORECTAL CA SCREEN DOC REV: CPT | Performed by: FAMILY MEDICINE

## 2017-09-12 PROCEDURE — G8400 PT W/DXA NO RESULTS DOC: HCPCS | Performed by: FAMILY MEDICINE

## 2017-09-12 PROCEDURE — G8418 CALC BMI BLW LOW PARAM F/U: HCPCS | Performed by: FAMILY MEDICINE

## 2017-09-12 PROCEDURE — 1036F TOBACCO NON-USER: CPT | Performed by: FAMILY MEDICINE

## 2017-09-12 PROCEDURE — G8926 SPIRO NO PERF OR DOC: HCPCS | Performed by: FAMILY MEDICINE

## 2017-09-12 PROCEDURE — 4040F PNEUMOC VAC/ADMIN/RCVD: CPT | Performed by: FAMILY MEDICINE

## 2017-09-12 PROCEDURE — G0008 ADMIN INFLUENZA VIRUS VAC: HCPCS | Performed by: FAMILY MEDICINE

## 2017-09-12 PROCEDURE — G8598 ASA/ANTIPLAT THER USED: HCPCS | Performed by: FAMILY MEDICINE

## 2017-09-12 PROCEDURE — 3014F SCREEN MAMMO DOC REV: CPT | Performed by: FAMILY MEDICINE

## 2017-09-12 PROCEDURE — 1123F ACP DISCUSS/DSCN MKR DOCD: CPT | Performed by: FAMILY MEDICINE

## 2017-09-12 PROCEDURE — G8427 DOCREV CUR MEDS BY ELIG CLIN: HCPCS | Performed by: FAMILY MEDICINE

## 2017-09-12 PROCEDURE — 99215 OFFICE O/P EST HI 40 MIN: CPT | Performed by: FAMILY MEDICINE

## 2017-09-12 RX ORDER — CARVEDILOL 25 MG/1
TABLET ORAL
Qty: 180 TABLET | Refills: 3 | Status: SHIPPED | OUTPATIENT
Start: 2017-09-12 | End: 2017-10-04 | Stop reason: SDUPTHER

## 2017-09-12 RX ORDER — CLOPIDOGREL BISULFATE 75 MG/1
TABLET ORAL
Qty: 30 TABLET | Refills: 5 | Status: SHIPPED | OUTPATIENT
Start: 2017-09-12 | End: 2018-04-02 | Stop reason: SDUPTHER

## 2017-09-12 RX ORDER — FUROSEMIDE 20 MG/1
TABLET ORAL
Qty: 30 TABLET | Refills: 5 | Status: SHIPPED | OUTPATIENT
Start: 2017-09-12 | End: 2018-11-12 | Stop reason: SDUPTHER

## 2017-09-12 RX ORDER — ATORVASTATIN CALCIUM 80 MG/1
TABLET, FILM COATED ORAL
Qty: 90 TABLET | Refills: 3 | Status: SHIPPED | OUTPATIENT
Start: 2017-09-12 | End: 2018-09-10 | Stop reason: SDUPTHER

## 2017-09-12 RX ORDER — POTASSIUM CHLORIDE 20 MEQ/1
TABLET, EXTENDED RELEASE ORAL
Qty: 90 TABLET | Refills: 3 | Status: SHIPPED | OUTPATIENT
Start: 2017-09-12 | End: 2018-12-21 | Stop reason: SDUPTHER

## 2017-09-12 ASSESSMENT — ENCOUNTER SYMPTOMS: DYSPNEA ASSOCIATED WITH: MINIMAL EXERTION

## 2017-09-13 ENCOUNTER — CARE COORDINATION (OUTPATIENT)
Dept: CARE COORDINATION | Age: 77
End: 2017-09-13

## 2017-09-27 ENCOUNTER — TELEPHONE (OUTPATIENT)
Dept: FAMILY MEDICINE CLINIC | Age: 77
End: 2017-09-27

## 2017-10-04 ENCOUNTER — TELEPHONE (OUTPATIENT)
Dept: FAMILY MEDICINE CLINIC | Age: 77
End: 2017-10-04

## 2017-10-04 DIAGNOSIS — I25.10 CORONARY ARTERY DISEASE INVOLVING NATIVE CORONARY ARTERY OF NATIVE HEART WITHOUT ANGINA PECTORIS: ICD-10-CM

## 2017-10-04 RX ORDER — CARVEDILOL 6.25 MG/1
TABLET ORAL
Qty: 60 TABLET | Refills: 5 | Status: SHIPPED | OUTPATIENT
Start: 2017-10-04 | End: 2018-04-18 | Stop reason: SDUPTHER

## 2017-10-04 RX ORDER — CARVEDILOL 6.25 MG/1
TABLET ORAL
Qty: 60 TABLET | Refills: 5 | Status: SHIPPED | OUTPATIENT
Start: 2017-10-04 | End: 2017-10-04 | Stop reason: SDUPTHER

## 2017-10-04 NOTE — TELEPHONE ENCOUNTER
States sent in new Rx for coreg with a lower dosage. States the Rx went to the wrong pharmacy.  Needs to have it sent into [facility] at [phone]

## 2017-10-31 ENCOUNTER — CARE COORDINATION (OUTPATIENT)
Dept: CARE COORDINATION | Age: 77
End: 2017-10-31

## 2017-10-31 NOTE — CARE COORDINATION
Ambulatory Care Coordination Note  10/31/2017  CM Risk Score: 15  Kristina Mortality Risk Score: 34.44    ACC: Aldo Perea RN    Summary Note: RNCC spoke with Iftikhar Gutierrez over the phone. She denies any complaints today. She believes her weight is increasing but stable since last visit. [facility] following 1 x week. She is cooking and bathing herself. Her pulse ox range on 2 l O2 is 98-99%. Up to date with flu and pneumonia vac. Action: reviewed CHF and COPD teaching. Praised daily wt and pulse ox monitoring. Reviewed diet. Encouraged to schedule f/u with Janes Thompson in February. PLAN:  F/u with [facility]  F/u call in one month. Care Coordination Interventions    Program Enrollment:  Complex Care  Referral from Primary Care Provider:  No  Suggested Interventions and Community Resources  Fall Risk Prevention:  Completed  Home Health Services: In Process  Meals on Wheels:  Declined  Registered Dietician:  Declined  Zone Management Tools: In Process         Goals Addressed             Most Recent     Self Monitoring   On track (10/31/2017)             Daily Weights - I will notify my provider of any increase in weight by 3 or more pounds in 2 days OR 5 or more pounds in a week. Patient Reported Weight No flowsheet data found. 82 lbs    Barriers: none  Plan for overcoming my barriers: N/A  Confidence: 9/10  Anticipated Goal Completion Date: permanent              Prior to Admission medications    Medication Sig Start Date End Date Taking? Authorizing Provider   carvedilol (COREG) 6.25 MG tablet TAKE 1 TABLET BY MOUTH 2 TIMES DAILY (WITH MEALS) 10/4/17  Yes Yari Harris MD   fluticasone-salmeterol (ADVAIR HFA) 711-49 MCG/ACT inhaler INHALE 2 PUFFS INTO THE LUNGS 2 TIMES DAILY 9/12/17  Yes Yari Harris MD   potassium chloride (KLOR-CON M) 20 MEQ extended release tablet TAKE ONE TABLET BY MOUTH EVERY OTHER DAY WITH LASIX (GENERIC NAME IS FUROSEMIDE).  9/12/17  Yes Manuel Hunter Sarai Jaffe MD   clopidogrel (PLAVIX) 75 MG tablet TAKE 1 TABLET BY MOUTH DAILY 9/12/17  Yes Edwige Hartmann MD   furosemide (LASIX) 20 MG tablet TAKE 1 TABLET EVERY OTHER DAY 9/12/17  Yes Edwige Hartmann MD   atorvastatin (LIPITOR) 80 MG tablet TAKE 1 TABLET BY MOUTH NIGHTLY 9/12/17  Yes Edwige Hartmann MD   sennosides-docusate sodium (SENOKOT-S) 8.6-50 MG tablet Take 1 tablet by mouth 2 times daily 8/11/17 8/11/18 Yes Edwige Hartmann MD   albuterol sulfate HFA (PROAIR HFA) 108 (90 BASE) MCG/ACT inhaler Inhale 2 puffs into the lungs every 6 hours as needed for Wheezing 9/14/16  Yes Edwige Hartmann MD   aspirin 81 MG EC tablet Take 1 tablet by mouth nightly. 3/18/15  Yes Hank Calderon MD   Multiple Vitamins-Minerals (CENTRUM SILVER PO) Take 1 tablet by mouth daily.    Yes Historical Provider, MD   Compression Stockings MISC by Does not apply route Bilateral knee high compression stockings, 15-20 mmHg daily for leg swelling 8/17/17   Karthikeyan Alejandra MD   Handicap Placard MISC by Does not apply route Duration 5 years 10/6/16   Edwige Hartmann MD   Elastic Bandages & Supports (B & B HERNIA BELT) MISC Use as needed for comfort 5/7/15   Edwige Hartmann MD       Future Appointments  Date Time Provider Rakesh Jacobson   11/28/2017 11:40 AM Rubio Garcia MD [address] [place] [address]   1/16/2018 10:30 AM Edwige Hartmann MD [facility]     COPD Assessment    Does the patient understand envrionmental exposure?:  Yes  Is the patient able to verbalize Rescue vs. Long Acting medications?:  Yes  Does the patient have a nebulizer?:  No  Does the patient use a space with inhaled medications?:  Yes     No patient-reported symptoms         Symptoms:          and   Congestive Heart Failure Assessment    Are you currently restricting fluids?:  No Restriction  Do you understand a low sodium diet?:  Yes  Do you understand how to read food labels?:  No  How many restaurant meals do you eat per week?:  1-2  Do you salt your food before tasting it?:  Yes     No patient-reported symptoms      Symptoms:   CHF associated angina: Neg, CHF associated dyspnea on exertion: Pos, CHF associated fatigue: Neg, CHF associated leg swelling: Neg, CHF associated orthostatic hypotension: Neg, CHF associated PND: Neg, CHF associated shortness of breath: Pos, CHF associated weakness: Neg      Symptom course:  stable  Patient-reported weight (lb):  75  Weight trend:  stable  Salt intake watch compared to last visit:  stable     ,

## 2017-11-13 ENCOUNTER — HOSPITAL ENCOUNTER (OUTPATIENT)
Dept: CT IMAGING | Age: 77
Discharge: HOME | End: 2017-11-13
Attending: INTERNAL MEDICINE | Admitting: INTERNAL MEDICINE

## 2017-11-13 DIAGNOSIS — C34.12 CANCER OF BRONCHUS OF LEFT UPPER LOBE (HCC): ICD-10-CM

## 2017-11-13 DIAGNOSIS — C34.12 MALIGNANT NEOPLASM OF UPPER LOBE, LEFT BRONCHUS OR LUNG (HCC): ICD-10-CM

## 2017-12-20 ENCOUNTER — CARE COORDINATION (OUTPATIENT)
Dept: CARE COORDINATION | Age: 77
End: 2017-12-20

## 2017-12-20 NOTE — CARE COORDINATION
Ambulatory Care Coordination Note  12/20/2017  CM Risk Score: 11  Kristina Mortality Risk Score: 36.39    ACC: Jeff Lyon RN    Summary Note: RNCC spoke with Josse Branham. She denies any symptoms today. She is c/o rib pain which she thinks is r/t her mattress. She is monitoring her weight daily and following no added salt diet. She is no longer wearing her support hose but has not had any swelling in her legs. She has been monitoring her O2 levels on  2 l O2 at rest and readings have been in the upper 90's. She would like to get a portable concentrator and plans to call [facility] regarding this. She reports her weight has been stable. She is very disappointed about not being able to drive and hopes to apply for driving privileges again. She plans to f/u with appt with  and   Action: Reviewed both COPD and CHF zones. Aware of same day availability. Sent handouts : COPD and CHF zones. Advised to discuss hair loss and rib pain with . PLAN:  F/U on PET scan  F/u on appt with specialist   F/u at next appt. Future Appointments  Date Time Provider Rakesh Jacobson   1/16/2018 10:30 AM Devin Morocho MD [facility]           Care Coordination Interventions    Program Enrollment:  Complex Care  Referral from Primary Care Provider:  No  Suggested Interventions and Community Resources  Fall Risk Prevention:  Completed  Home Health Services:  Completed  Meals on Wheels:  Declined  Registered Dietician:  Declined  Zone Management Tools: In Process         Goals Addressed             Most Recent     Self Monitoring   On track (12/20/2017)             Daily Weights - I will notify my provider of any increase in weight by 3 or more pounds in 2 days OR 5 or more pounds in a week. Patient Reported Weight No flowsheet data found.   82 lbs    Barriers: none  Plan for overcoming my barriers: N/A  Confidence: 9/10  Anticipated Goal Completion Date: permanent              Prior to Admission medications    Medication Sig Start Date End Date Taking? Authorizing Provider   carvedilol (COREG) 6.25 MG tablet TAKE 1 TABLET BY MOUTH 2 TIMES DAILY (WITH MEALS) 10/4/17  Yes Joan Lopez MD   fluticasone-salmeterol (ADVAIR HFA) 707-18 MCG/ACT inhaler INHALE 2 PUFFS INTO THE LUNGS 2 TIMES DAILY 9/12/17  Yes Joan Lopez MD   potassium chloride (KLOR-CON M) 20 MEQ extended release tablet TAKE ONE TABLET BY MOUTH EVERY OTHER DAY WITH LASIX (GENERIC NAME IS FUROSEMIDE). 9/12/17  Yes Joan Lopez MD   clopidogrel (PLAVIX) 75 MG tablet TAKE 1 TABLET BY MOUTH DAILY 9/12/17  Yes Joan Lopez MD   furosemide (LASIX) 20 MG tablet TAKE 1 TABLET EVERY OTHER DAY 9/12/17  Yes Joan Lopez MD   atorvastatin (LIPITOR) 80 MG tablet TAKE 1 TABLET BY MOUTH NIGHTLY 9/12/17  Yes Joan Lopez MD   sennosides-docusate sodium (SENOKOT-S) 8.6-50 MG tablet Take 1 tablet by mouth 2 times daily 8/11/17 8/11/18 Yes Joan Lopez MD   albuterol sulfate HFA (PROAIR HFA) 108 (90 BASE) MCG/ACT inhaler Inhale 2 puffs into the lungs every 6 hours as needed for Wheezing 9/14/16  Yes Joan Lopez MD   aspirin 81 MG EC tablet Take 1 tablet by mouth nightly. 3/18/15  Yes Blane Israel MD   Multiple Vitamins-Minerals (CENTRUM SILVER PO) Take 1 tablet by mouth daily.    Yes Historical Provider, MD   Compression Stockings MISC by Does not apply route Bilateral knee high compression stockings, 15-20 mmHg daily for leg swelling 8/17/17   Ashley Anderson MD   Handicap Placard MISC by Does not apply route Duration 5 years 10/6/16   Joan Lopez MD   Elastic Bandages & Supports (B & B HERNIA BELT) MISC Use as needed for comfort 5/7/15   Joan Lopez MD       Future Appointments  Date Time Provider Rakesh Jacobson   1/16/2018 10:30 AM oJan Lopez MD [facility]     COPD Assessment    Does the patient understand envrionmental exposure?:  Yes  Is the patient able to verbalize Rescue vs. Long Acting

## 2018-01-11 ENCOUNTER — TELEPHONE (OUTPATIENT)
Dept: FAMILY MEDICINE CLINIC | Age: 78
End: 2018-01-11

## 2018-01-11 RX ORDER — OMEPRAZOLE 20 MG/1
20 CAPSULE, DELAYED RELEASE ORAL DAILY
COMMUNITY
End: 2018-01-11 | Stop reason: SDUPTHER

## 2018-01-11 RX ORDER — OMEPRAZOLE 20 MG/1
20 CAPSULE, DELAYED RELEASE ORAL DAILY
Qty: 30 CAPSULE | Refills: 5 | Status: SHIPPED | OUTPATIENT
Start: 2018-01-11 | End: 2018-09-10 | Stop reason: SDUPTHER

## 2018-01-30 ENCOUNTER — OFFICE VISIT (OUTPATIENT)
Dept: FAMILY MEDICINE CLINIC | Age: 78
End: 2018-01-30

## 2018-01-30 ENCOUNTER — CARE COORDINATION (OUTPATIENT)
Dept: CARE COORDINATION | Age: 78
End: 2018-01-30

## 2018-01-30 VITALS
SYSTOLIC BLOOD PRESSURE: 188 MMHG | WEIGHT: 81.4 LBS | BODY MASS INDEX: 15.38 KG/M2 | HEART RATE: 61 BPM | OXYGEN SATURATION: 87 % | DIASTOLIC BLOOD PRESSURE: 66 MMHG

## 2018-01-30 DIAGNOSIS — J43.2 CENTRILOBULAR EMPHYSEMA (HCC): ICD-10-CM

## 2018-01-30 DIAGNOSIS — I25.10 CORONARY ARTERY DISEASE INVOLVING NATIVE CORONARY ARTERY OF NATIVE HEART WITHOUT ANGINA PECTORIS: ICD-10-CM

## 2018-01-30 DIAGNOSIS — C34.12 MALIGNANT NEOPLASM OF UPPER LOBE OF LEFT LUNG (HCC): Primary | ICD-10-CM

## 2018-01-30 DIAGNOSIS — D64.9 ANEMIA, UNSPECIFIED TYPE: ICD-10-CM

## 2018-01-30 DIAGNOSIS — I10 ESSENTIAL HYPERTENSION, BENIGN: ICD-10-CM

## 2018-01-30 DIAGNOSIS — I73.9 PAD (PERIPHERAL ARTERY DISEASE) (HCC): ICD-10-CM

## 2018-01-30 LAB
A/G RATIO: 1.8 (ref 1.1–2.2)
ALBUMIN SERPL-MCNC: 4.1 G/DL (ref 3.4–5)
ALP BLD-CCNC: 71 U/L (ref 40–129)
ALT SERPL-CCNC: 29 U/L (ref 10–40)
ANION GAP SERPL CALCULATED.3IONS-SCNC: 17 MMOL/L (ref 3–16)
AST SERPL-CCNC: 40 U/L (ref 15–37)
BASOPHILS ABSOLUTE: 0 K/UL (ref 0–0.2)
BASOPHILS RELATIVE PERCENT: 0.4 %
BILIRUB SERPL-MCNC: 0.3 MG/DL (ref 0–1)
BUN BLDV-MCNC: 22 MG/DL (ref 7–20)
CALCIUM SERPL-MCNC: 9.1 MG/DL (ref 8.3–10.6)
CHLORIDE BLD-SCNC: 104 MMOL/L (ref 99–110)
CO2: 25 MMOL/L (ref 21–32)
CREAT SERPL-MCNC: 0.7 MG/DL (ref 0.6–1.2)
EOSINOPHILS ABSOLUTE: 0.5 K/UL (ref 0–0.6)
EOSINOPHILS RELATIVE PERCENT: 5.9 %
GFR AFRICAN AMERICAN: >60
GFR NON-AFRICAN AMERICAN: >60
GLOBULIN: 2.3 G/DL
GLUCOSE BLD-MCNC: 90 MG/DL (ref 70–99)
HCT VFR BLD CALC: 36.4 % (ref 36–48)
HEMOGLOBIN: 11.8 G/DL (ref 12–16)
LYMPHOCYTES ABSOLUTE: 1 K/UL (ref 1–5.1)
LYMPHOCYTES RELATIVE PERCENT: 11.9 %
MCH RBC QN AUTO: 31.9 PG (ref 26–34)
MCHC RBC AUTO-ENTMCNC: 32.3 G/DL (ref 31–36)
MCV RBC AUTO: 98.7 FL (ref 80–100)
MONOCYTES ABSOLUTE: 0.6 K/UL (ref 0–1.3)
MONOCYTES RELATIVE PERCENT: 7 %
NEUTROPHILS ABSOLUTE: 6 K/UL (ref 1.7–7.7)
NEUTROPHILS RELATIVE PERCENT: 74.8 %
PDW BLD-RTO: 13.5 % (ref 12.4–15.4)
PLATELET # BLD: 316 K/UL (ref 135–450)
PMV BLD AUTO: 7 FL (ref 5–10.5)
POTASSIUM SERPL-SCNC: 4.5 MMOL/L (ref 3.5–5.1)
RBC # BLD: 3.69 M/UL (ref 4–5.2)
SODIUM BLD-SCNC: 146 MMOL/L (ref 136–145)
TOTAL PROTEIN: 6.4 G/DL (ref 6.4–8.2)
WBC # BLD: 8.1 K/UL (ref 4–11)

## 2018-01-30 PROCEDURE — 36415 COLL VENOUS BLD VENIPUNCTURE: CPT | Performed by: FAMILY MEDICINE

## 2018-01-30 PROCEDURE — G8484 FLU IMMUNIZE NO ADMIN: HCPCS | Performed by: FAMILY MEDICINE

## 2018-01-30 PROCEDURE — 1090F PRES/ABSN URINE INCON ASSESS: CPT | Performed by: FAMILY MEDICINE

## 2018-01-30 PROCEDURE — 1036F TOBACCO NON-USER: CPT | Performed by: FAMILY MEDICINE

## 2018-01-30 PROCEDURE — 3023F SPIROM DOC REV: CPT | Performed by: FAMILY MEDICINE

## 2018-01-30 PROCEDURE — G8926 SPIRO NO PERF OR DOC: HCPCS | Performed by: FAMILY MEDICINE

## 2018-01-30 PROCEDURE — G8427 DOCREV CUR MEDS BY ELIG CLIN: HCPCS | Performed by: FAMILY MEDICINE

## 2018-01-30 PROCEDURE — G8598 ASA/ANTIPLAT THER USED: HCPCS | Performed by: FAMILY MEDICINE

## 2018-01-30 PROCEDURE — G8400 PT W/DXA NO RESULTS DOC: HCPCS | Performed by: FAMILY MEDICINE

## 2018-01-30 PROCEDURE — 1123F ACP DISCUSS/DSCN MKR DOCD: CPT | Performed by: FAMILY MEDICINE

## 2018-01-30 PROCEDURE — 99215 OFFICE O/P EST HI 40 MIN: CPT | Performed by: FAMILY MEDICINE

## 2018-01-30 PROCEDURE — G8419 CALC BMI OUT NRM PARAM NOF/U: HCPCS | Performed by: FAMILY MEDICINE

## 2018-01-30 PROCEDURE — 3017F COLORECTAL CA SCREEN DOC REV: CPT | Performed by: FAMILY MEDICINE

## 2018-01-30 PROCEDURE — 4040F PNEUMOC VAC/ADMIN/RCVD: CPT | Performed by: FAMILY MEDICINE

## 2018-01-30 RX ORDER — NORTRIPTYLINE HYDROCHLORIDE 10 MG/1
10 CAPSULE ORAL NIGHTLY
Qty: 30 CAPSULE | Refills: 1 | Status: SHIPPED | OUTPATIENT
Start: 2018-01-30 | End: 2018-06-13 | Stop reason: SDUPTHER

## 2018-01-30 ASSESSMENT — ENCOUNTER SYMPTOMS
ABDOMINAL PAIN: 0
DIARRHEA: 0
NAUSEA: 0
EYE PAIN: 0
CONSTIPATION: 0
VOMITING: 0
DYSPNEA ASSOCIATED WITH: MINIMAL EXERTION
COLOR CHANGE: 0
RHINORRHEA: 0
COUGH: 1
SHORTNESS OF BREATH: 1
WHEEZING: 1
SORE THROAT: 0

## 2018-01-30 NOTE — CARE COORDINATION
2 days OR 5 or more pounds in a week. Patient Reported Weight No flowsheet data found. 82 lbs    Barriers: none  Plan for overcoming my barriers: N/A  Confidence: 9/10  Anticipated Goal Completion Date: permanent              Prior to Admission medications    Medication Sig Start Date End Date Taking? Authorizing Provider   carvedilol (COREG) 6.25 MG tablet TAKE 1 TABLET BY MOUTH 2 TIMES DAILY (WITH MEALS) 10/4/17  Yes Sharath Quevedo MD   fluticasone-salmeterol (ADVAIR HFA) 434-59 MCG/ACT inhaler INHALE 2 PUFFS INTO THE LUNGS 2 TIMES DAILY 9/12/17  Yes Sharath Quevedo MD   potassium chloride (KLOR-CON M) 20 MEQ extended release tablet TAKE ONE TABLET BY MOUTH EVERY OTHER DAY WITH LASIX (GENERIC NAME IS FUROSEMIDE). 9/12/17  Yes Sharath Quevedo MD   clopidogrel (PLAVIX) 75 MG tablet TAKE 1 TABLET BY MOUTH DAILY 9/12/17  Yes Sharath Quevedo MD   furosemide (LASIX) 20 MG tablet TAKE 1 TABLET EVERY OTHER DAY 9/12/17  Yes Sharath Quevedo MD   atorvastatin (LIPITOR) 80 MG tablet TAKE 1 TABLET BY MOUTH NIGHTLY 9/12/17  Yes Sharath Quevedo MD   sennosides-docusate sodium (SENOKOT-S) 8.6-50 MG tablet Take 1 tablet by mouth 2 times daily  Patient taking differently: Take 1 tablet by mouth as needed  8/11/17 8/11/18 Yes Sharath Quevedo MD   albuterol sulfate HFA (PROAIR HFA) 108 (90 BASE) MCG/ACT inhaler Inhale 2 puffs into the lungs every 6 hours as needed for Wheezing 9/14/16  Yes Sharath Quevedo MD   aspirin 81 MG EC tablet Take 1 tablet by mouth nightly. 3/18/15  Yes Roque Whyte MD   Multiple Vitamins-Minerals (CENTRUM SILVER PO) Take 1 tablet by mouth daily.    Yes Historical Provider, MD   nortriptyline (PAMELOR) 10 MG capsule Take 1 capsule by mouth nightly 1/30/18   Sharath Quevedo MD   omeprazole (PRILOSEC) 20 MG delayed release capsule Take 1 capsule by mouth daily 1/11/18   Sharath Quevedo MD   Compression Stockings MISC by Does not apply route Bilateral knee high compression stockings, 15-20 mmHg daily for leg swelling 17   Jade Tinoco MD   Handicap Placard MISC by Does not apply route Duration 5 years 10/6/16   Severa Hensen, MD   Elastic Bandages & Supports (B & B HERNIA BELT) MISC Use as needed for comfort 5/7/15   Severa Hensen, MD       Future Appointments  Date Time Provider Rakesh Jacobson   2018 10:30 AM Severa Hensen, MD [facility]     COPD Assessment    Does the patient understand envrionmental exposure?:  Yes  Is the patient able to verbalize Rescue vs. Long Acting medications?:  Yes  Does the patient have a nebulizer?:  No  Does the patient use a space with inhaled medications?:  Yes     Shortness of breath (worse than baseline)         Symptoms:      Symptom course:  worsening  Breathlessness:  minimal exertion  Increase use of rapid acting/rescue inhaled medications?:  Yes  Change in chronic cough?:  No/At Baseline  Change in sputum?:  No/At Baseline  Sputum characteristics:  White, Clear  Self Monitoring - SaO2:  Yes  Baseline SaO2 Readin      and   Congestive Heart Failure Assessment    Are you currently restricting fluids?:  No Restriction  Do you understand a low sodium diet?:  Yes  Do you understand how to read food labels?:  No  How many restaurant meals do you eat per week?:  1-2  Do you salt your food before tasting it?:  Yes     No patient-reported symptoms      Symptoms:   CHF associated angina: Neg, CHF associated dyspnea on exertion: Pos, CHF associated fatigue: Neg, CHF associated leg swelling: Neg, CHF associated orthostatic hypotension: Neg, CHF associated PND: Neg, CHF associated shortness of breath: Neg, CHF associated weakness: Neg      Symptom course:  stable  Weight trend:  increasing steadily  Salt intake watch compared to last visit:  stable     ,

## 2018-01-30 NOTE — PROGRESS NOTES
Chief Complaint   Patient presents with    Hypertension       HPI:Rosalva Hazel presents for evaluation and management of Follow-up on multiple medical problems as a highly comorbid complicated and at risk patient. Jermainewily Howard has been keeping her follow-up with Dr. Ramesh Borrero. Most recent CT scan did not reveal progression of disease but was not clear either. Current plan is expectant management as she is only appropriate for palliative treatment if she does have recurrence. She notes she continues with her chronic medications for peripheral arterial disease. Does not have any leg pain but does not walk very much either. She remains compliant with her inhaler therapy for her emphysema. She is abstinent of smoking and notes her breathing is pretty good with oxygen therapy but she does note some increased shortness of breath, her cough has been stable and has been using her Proventil about 2 times a week    She reports her appetite is stable and has actually gained a little bit awake. She denies any chest pain, and has been compliant with her medications for coronary artery disease and hypertension. She notes no blood loss nor easy bleeding or bruising. Review of Systems   Constitutional: Positive for chills. Negative for fever. HENT: Negative for ear pain, rhinorrhea and sore throat. Eyes: Negative for pain and visual disturbance. Respiratory: Positive for cough, shortness of breath and wheezing. Cardiovascular: Negative for chest pain and palpitations. Gastrointestinal: Negative for abdominal pain, constipation, diarrhea, nausea and vomiting. Genitourinary: Negative for dysuria and frequency. Musculoskeletal: Negative for joint swelling and myalgias. Skin: Negative for color change and rash. Neurological: Positive for weakness. Negative for numbness and headaches. Hematological: Negative for adenopathy. Does not bruise/bleed easily.    Psychiatric/Behavioral: Negative for dysphoric medications for this visit.         Past Medical History:   Diagnosis Date    ACP (advance care planning)     Requests DNR    Arthritis     mild    AVM (arteriovenous malformation) of duodenum, acquired 2015    CAD (coronary artery disease) 2015    RCA x 2 XAVIER BY DR. Tess Matias    Carotid stenosis 2014    bilat 50-79% 2014    Chronic respiratory failure with hypoxia ([address]     HOME O2    COPD (chronic obstructive pulmonary disease) ([address]     Diastolic CHF ([address]     2017 ECHO = EF 60% + GRADE I DD    HTN (hypertension)     Incarcerated right inguinal hernia 2017    REDUCED IN ED    Left inguinal hernia     Macular hemorrhage of left eye 2017    Dr. Vince Villalta NSTEMI (non-ST elevated myocardial infarction) [facility])     Occluded distal aorta ([address]     PAD (peripheral artery disease) ([address]     SEVERE    Pulmonary HTN 2017 ECHO = RVSP @ 62 + MILD TR    ZI (renal artery stenosis) (HCC)     SBO (small bowel obstruction) 2017    2/2 HERNIA    Small cell lung cancer ([address] 09/15/2014    SCCA 12 mm :  Dr. Esteves P[place] : [facility] [place]  /  (R) WEDGE RESECTION  /  2015 RECURRANCE W/ NORA STEROTATIC RADIOSURGERY    Wears glasses     reading    Weight loss     25 lbs over last 4 yrs     Past Surgical History:   Procedure Laterality Date    CATARACT REMOVAL Bilateral     Dr. Saray Winters Right 2017    Dr. Harry Virgen Right 9/15/14    wedge excision RLL nodule - squamous cell    THORACOTOMY Right 9/15/14    Right VATS with wedge excision right lower lung nodule:  Dr. Caren Bryant  age 27    UPPER GASTROINTESTINAL ENDOSCOPY  4-20-15     Family History   Problem Relation Age of Onset    Cancer Mother      lymphoma  in 68's    Heart Disease Mother     Heart Attack Father      late onset    Diabetes Father      Social History is normal.   Vitals reviewed. Chemistry        Component Value Date/Time     (L) 08/09/2017 0432    K 5.0 08/09/2017 0432     08/09/2017 0432    CO2 25 08/09/2017 0432    BUN 20 08/09/2017 0432    CREATININE 0.9 11/13/2017 0916    CREATININE 0.6 08/09/2017 0432        Component Value Date/Time    CALCIUM 7.6 (L) 08/09/2017 0432    ALKPHOS 47 08/05/2017 1756     (H) 08/04/2017 1222    ALT 79 (H) 08/04/2017 1222    BILITOT 0.6 08/04/2017 1222          Lab Results   Component Value Date    WBC 9.9 08/09/2017    HGB 10.3 (L) 08/09/2017    HCT 30.7 (L) 08/09/2017    MCV 87.3 08/09/2017     08/09/2017     No results found for: LABA1C  No results found for: EAG  No results found for: LABA1C  No components found for: CHLPL  Lab Results   Component Value Date    TRIG 46 02/16/2017    TRIG 74 04/01/2016    TRIG 164 (H) 03/16/2015     Lab Results   Component Value Date    HDL 59 02/16/2017    HDL 52 04/01/2016    HDL 38 (L) 03/16/2015     Lab Results   Component Value Date    LDLCALC 53 02/16/2017    LDLCALC 56 04/01/2016    LDLCALC 53 03/16/2015     Lab Results   Component Value Date    LABVLDL 9 02/16/2017    LABVLDL 15 04/01/2016    LABVLDL 33 03/16/2015         Assessment   Plan     1. Malignant neoplasm of upper lobe of left lung [facility])  Continuing expectant management. Patient would qualify only for palliative treatment if she has progression of disease on her next imaging to be done in 3 months. Keep follow-up with Dr. Julianna Alvarenga  - Comprehensive Metabolic Panel    2. PAD (peripheral artery disease) (HCC)  Appears controlled: Chronic and persistent, continue meds and manage risk factors    3. Centrilobular emphysema ([address]  Appears controlled and stable: Continue meds follow-up 3 months    4. Coronary artery disease involving native coronary artery of native heart without angina pectoris  Appears stable: Continue med management and follow up in 3 months    5.  Essential hypertension, benign  With isolated systolic hypertension. Diastolic blood pressures are too low to safely titrate medications up. Continue current therapy and follow-up 3 months  - Comprehensive Metabolic Panel    6. Anemia, unspecified type  Chronic problems: We will assess for stability with labs today follow-up 3 months  - CBC Auto Differential  Discussed use, benefit, and side effects of prescribed medications. Barriers to medication compliance addressed. All patient questions answered. Pt voiced understanding.          Health Maintenance   Topic Date Due    DTaP/Tdap/Td vaccine (1 - Tdap) 11/09/1959    Zostavax vaccine  11/09/2002    DEXA (modify frequency per FRAX score)  11/09/2007    Colon Cancer Screen FIT/FOBT  08/04/2018    Potassium monitoring  08/09/2018    Creatinine monitoring  08/09/2018    Lipid screen  02/16/2022    Flu vaccine  Completed    Pneumococcal low/med risk  Completed       RTC 3 months

## 2018-03-22 ENCOUNTER — CARE COORDINATION (OUTPATIENT)
Dept: CARE COORDINATION | Age: 78
End: 2018-03-22

## 2018-04-02 DIAGNOSIS — I73.9 PAD (PERIPHERAL ARTERY DISEASE) (HCC): ICD-10-CM

## 2018-04-02 DIAGNOSIS — I25.10 CORONARY ARTERY DISEASE INVOLVING NATIVE CORONARY ARTERY OF NATIVE HEART WITHOUT ANGINA PECTORIS: ICD-10-CM

## 2018-04-02 RX ORDER — CLOPIDOGREL BISULFATE 75 MG/1
TABLET ORAL
Qty: 30 TABLET | Refills: 5 | Status: SHIPPED | OUTPATIENT
Start: 2018-04-02 | End: 2018-09-10 | Stop reason: SDUPTHER

## 2018-04-09 ENCOUNTER — HOSPITAL ENCOUNTER (OUTPATIENT)
Dept: CT IMAGING | Age: 78
Discharge: HOME | End: 2018-04-09
Attending: INTERNAL MEDICINE | Admitting: INTERNAL MEDICINE

## 2018-04-09 DIAGNOSIS — C34.12 CANCER OF BRONCHUS OF LEFT UPPER LOBE (HCC): ICD-10-CM

## 2018-04-09 DIAGNOSIS — C34.12 MALIGNANT NEOPLASM OF UPPER LOBE, LEFT BRONCHUS OR LUNG (HCC): ICD-10-CM

## 2018-04-12 ENCOUNTER — TELEPHONE (OUTPATIENT)
Dept: FAMILY MEDICINE CLINIC | Age: 78
End: 2018-04-12

## 2018-04-12 NOTE — TELEPHONE ENCOUNTER
Please call and have her take 2 of her Coreg tablets twice a day for total of 4 tablets in a day.  I would like to see her in the next 1-4 days

## 2018-04-12 NOTE — TELEPHONE ENCOUNTER
Patient called wanted Dr. Mercedes Reed to know that her blood pressure is up to around 200. She states she feels fine and didn't realize it was that high until she had it checked while at the hospital when she had testing done. She thought it may have spiked because she recently saw her cancer doctor. So she'd like to know if because her BP has risen if she should increase her BP medication or not. Please call back and adv [phone].

## 2018-04-18 ENCOUNTER — CARE COORDINATION (OUTPATIENT)
Dept: CARE COORDINATION | Age: 78
End: 2018-04-18

## 2018-04-18 ENCOUNTER — OFFICE VISIT (OUTPATIENT)
Dept: FAMILY MEDICINE CLINIC | Age: 78
End: 2018-04-18

## 2018-04-18 VITALS
BODY MASS INDEX: 15.55 KG/M2 | SYSTOLIC BLOOD PRESSURE: 138 MMHG | HEART RATE: 72 BPM | HEIGHT: 61 IN | WEIGHT: 82.38 LBS | DIASTOLIC BLOOD PRESSURE: 84 MMHG

## 2018-04-18 DIAGNOSIS — C34.12 MALIGNANT NEOPLASM OF UPPER LOBE OF LEFT LUNG (HCC): ICD-10-CM

## 2018-04-18 DIAGNOSIS — I10 ESSENTIAL HYPERTENSION, BENIGN: Primary | ICD-10-CM

## 2018-04-18 DIAGNOSIS — I25.10 CORONARY ARTERY DISEASE INVOLVING NATIVE CORONARY ARTERY OF NATIVE HEART WITHOUT ANGINA PECTORIS: ICD-10-CM

## 2018-04-18 DIAGNOSIS — J43.2 CENTRILOBULAR EMPHYSEMA (HCC): ICD-10-CM

## 2018-04-18 DIAGNOSIS — I73.9 PERIPHERAL VASCULAR DISEASE (HCC): ICD-10-CM

## 2018-04-18 PROCEDURE — G8926 SPIRO NO PERF OR DOC: HCPCS | Performed by: FAMILY MEDICINE

## 2018-04-18 PROCEDURE — 4040F PNEUMOC VAC/ADMIN/RCVD: CPT | Performed by: FAMILY MEDICINE

## 2018-04-18 PROCEDURE — G8598 ASA/ANTIPLAT THER USED: HCPCS | Performed by: FAMILY MEDICINE

## 2018-04-18 PROCEDURE — 99215 OFFICE O/P EST HI 40 MIN: CPT | Performed by: FAMILY MEDICINE

## 2018-04-18 PROCEDURE — 3023F SPIROM DOC REV: CPT | Performed by: FAMILY MEDICINE

## 2018-04-18 PROCEDURE — 3017F COLORECTAL CA SCREEN DOC REV: CPT | Performed by: FAMILY MEDICINE

## 2018-04-18 PROCEDURE — 1090F PRES/ABSN URINE INCON ASSESS: CPT | Performed by: FAMILY MEDICINE

## 2018-04-18 PROCEDURE — G8400 PT W/DXA NO RESULTS DOC: HCPCS | Performed by: FAMILY MEDICINE

## 2018-04-18 PROCEDURE — 1123F ACP DISCUSS/DSCN MKR DOCD: CPT | Performed by: FAMILY MEDICINE

## 2018-04-18 PROCEDURE — G8419 CALC BMI OUT NRM PARAM NOF/U: HCPCS | Performed by: FAMILY MEDICINE

## 2018-04-18 PROCEDURE — 1036F TOBACCO NON-USER: CPT | Performed by: FAMILY MEDICINE

## 2018-04-18 PROCEDURE — G8427 DOCREV CUR MEDS BY ELIG CLIN: HCPCS | Performed by: FAMILY MEDICINE

## 2018-04-18 RX ORDER — CARVEDILOL 25 MG/1
TABLET ORAL
Qty: 60 TABLET | Refills: 5 | Status: ON HOLD | OUTPATIENT
Start: 2018-04-18 | End: 2018-04-24 | Stop reason: HOSPADM

## 2018-04-18 ASSESSMENT — ENCOUNTER SYMPTOMS
COUGH: 1
VOMITING: 0
NAUSEA: 0
DIARRHEA: 0
COLOR CHANGE: 0
SORE THROAT: 0
ABDOMINAL PAIN: 1
RHINORRHEA: 0
CONSTIPATION: 1
EYE PAIN: 0
SHORTNESS OF BREATH: 1

## 2018-04-25 ENCOUNTER — CARE COORDINATION (OUTPATIENT)
Dept: CASE MANAGEMENT | Age: 78
End: 2018-04-25

## 2018-04-25 DIAGNOSIS — C34.12 MALIGNANT NEOPLASM OF UPPER LOBE OF LEFT LUNG (HCC): Primary | ICD-10-CM

## 2018-04-25 PROCEDURE — 1111F DSCHRG MED/CURRENT MED MERGE: CPT

## 2018-05-04 ENCOUNTER — CARE COORDINATION (OUTPATIENT)
Dept: CASE MANAGEMENT | Age: 78
End: 2018-05-04

## 2018-05-08 ENCOUNTER — CARE COORDINATION (OUTPATIENT)
Dept: CASE MANAGEMENT | Age: 78
End: 2018-05-08

## 2018-05-18 ENCOUNTER — OFFICE VISIT (OUTPATIENT)
Dept: SURGERY | Age: 78
End: 2018-05-18

## 2018-05-18 VITALS
SYSTOLIC BLOOD PRESSURE: 179 MMHG | DIASTOLIC BLOOD PRESSURE: 70 MMHG | BODY MASS INDEX: 15.27 KG/M2 | WEIGHT: 80.8 LBS | HEART RATE: 59 BPM

## 2018-05-18 DIAGNOSIS — K41.30 FEMORAL HERNIA OF LEFT SIDE WITH OBSTRUCTION: Primary | ICD-10-CM

## 2018-05-18 PROCEDURE — 99024 POSTOP FOLLOW-UP VISIT: CPT | Performed by: SURGERY

## 2018-06-13 RX ORDER — NORTRIPTYLINE HYDROCHLORIDE 10 MG/1
10 CAPSULE ORAL NIGHTLY
Qty: 30 CAPSULE | Refills: 5 | Status: SHIPPED | OUTPATIENT
Start: 2018-06-13 | End: 2018-11-12 | Stop reason: SDUPTHER

## 2018-06-28 ENCOUNTER — CARE COORDINATION (OUTPATIENT)
Dept: CARE COORDINATION | Age: 78
End: 2018-06-28

## 2018-06-28 ASSESSMENT — ENCOUNTER SYMPTOMS: DYSPNEA ASSOCIATED WITH: MINIMAL EXERTION

## 2018-07-17 ENCOUNTER — HOSPITAL ENCOUNTER (OUTPATIENT)
Dept: CT IMAGING | Age: 78
Discharge: HOME | End: 2018-07-17
Attending: INTERNAL MEDICINE | Admitting: INTERNAL MEDICINE

## 2018-07-17 DIAGNOSIS — C34.12 MALIGNANT NEOPLASM OF UPPER LOBE, LEFT BRONCHUS OR LUNG (HCC): ICD-10-CM

## 2018-07-17 DIAGNOSIS — C34.10 MALIGNANT NEOPLASM OF UPPER LOBE OF LUNG, UNSPECIFIED LATERALITY (HCC): ICD-10-CM

## 2018-07-24 ENCOUNTER — TELEPHONE (OUTPATIENT)
Dept: FAMILY MEDICINE CLINIC | Age: 78
End: 2018-07-24

## 2018-07-24 NOTE — TELEPHONE ENCOUNTER
Attempted to contact patient on 7/24/2018. Result: Patient answered and stated that this was a bad time to call later. Pre-Visit planning not completed.

## 2018-07-30 ENCOUNTER — OFFICE VISIT (OUTPATIENT)
Dept: CARDIOLOGY CLINIC | Age: 78
End: 2018-07-30

## 2018-07-30 VITALS
HEART RATE: 54 BPM | SYSTOLIC BLOOD PRESSURE: 138 MMHG | HEIGHT: 61 IN | WEIGHT: 79 LBS | DIASTOLIC BLOOD PRESSURE: 64 MMHG | BODY MASS INDEX: 14.91 KG/M2

## 2018-07-30 DIAGNOSIS — I73.9 CLAUDICATION (HCC): ICD-10-CM

## 2018-07-30 DIAGNOSIS — I25.5 ISCHEMIC CARDIOMYOPATHY: ICD-10-CM

## 2018-07-30 DIAGNOSIS — I73.9 PAD (PERIPHERAL ARTERY DISEASE) (HCC): ICD-10-CM

## 2018-07-30 DIAGNOSIS — I10 ESSENTIAL HYPERTENSION, BENIGN: ICD-10-CM

## 2018-07-30 DIAGNOSIS — I25.10 CORONARY ARTERY DISEASE INVOLVING NATIVE CORONARY ARTERY OF NATIVE HEART WITHOUT ANGINA PECTORIS: Primary | ICD-10-CM

## 2018-07-30 PROCEDURE — 93000 ELECTROCARDIOGRAM COMPLETE: CPT | Performed by: INTERNAL MEDICINE

## 2018-07-30 PROCEDURE — G8400 PT W/DXA NO RESULTS DOC: HCPCS | Performed by: INTERNAL MEDICINE

## 2018-07-30 PROCEDURE — G8419 CALC BMI OUT NRM PARAM NOF/U: HCPCS | Performed by: INTERNAL MEDICINE

## 2018-07-30 PROCEDURE — 3017F COLORECTAL CA SCREEN DOC REV: CPT | Performed by: INTERNAL MEDICINE

## 2018-07-30 PROCEDURE — 99214 OFFICE O/P EST MOD 30 MIN: CPT | Performed by: INTERNAL MEDICINE

## 2018-07-30 PROCEDURE — 1090F PRES/ABSN URINE INCON ASSESS: CPT | Performed by: INTERNAL MEDICINE

## 2018-07-30 PROCEDURE — 1036F TOBACCO NON-USER: CPT | Performed by: INTERNAL MEDICINE

## 2018-07-30 PROCEDURE — G8427 DOCREV CUR MEDS BY ELIG CLIN: HCPCS | Performed by: INTERNAL MEDICINE

## 2018-07-30 PROCEDURE — G8598 ASA/ANTIPLAT THER USED: HCPCS | Performed by: INTERNAL MEDICINE

## 2018-07-30 PROCEDURE — 4040F PNEUMOC VAC/ADMIN/RCVD: CPT | Performed by: INTERNAL MEDICINE

## 2018-07-30 PROCEDURE — 1123F ACP DISCUSS/DSCN MKR DOCD: CPT | Performed by: INTERNAL MEDICINE

## 2018-07-30 PROCEDURE — 1101F PT FALLS ASSESS-DOCD LE1/YR: CPT | Performed by: INTERNAL MEDICINE

## 2018-07-30 NOTE — PROGRESS NOTES
daily (with meals) Advised at hospital to hold evening dose if pulse < 65) 60 tablet 3    clopidogrel (PLAVIX) 75 MG tablet TAKE ONE TABLET BY MOUTH DAILY. 30 tablet 5    omeprazole (PRILOSEC) 20 MG delayed release capsule Take 1 capsule by mouth daily 30 capsule 5    fluticasone-salmeterol (ADVAIR HFA) 230-21 MCG/ACT inhaler INHALE 2 PUFFS INTO THE LUNGS 2 TIMES DAILY 36 g 3    potassium chloride (KLOR-CON M) 20 MEQ extended release tablet TAKE ONE TABLET BY MOUTH EVERY OTHER DAY WITH LASIX (GENERIC NAME IS FUROSEMIDE). 90 tablet 3    furosemide (LASIX) 20 MG tablet TAKE 1 TABLET EVERY OTHER DAY 30 tablet 5    atorvastatin (LIPITOR) 80 MG tablet TAKE 1 TABLET BY MOUTH NIGHTLY 90 tablet 3    Compression Stockings MISC by Does not apply route Bilateral knee high compression stockings, 15-20 mmHg daily for leg swelling 2 each 1    sennosides-docusate sodium (SENOKOT-S) 8.6-50 MG tablet Take 1 tablet by mouth 2 times daily (Patient taking differently: Take 1 tablet by mouth as needed ) 60 tablet 11    Handicap Placard MISC by Does not apply route Duration 5 years 1 each 0    albuterol sulfate HFA (PROAIR HFA) 108 (90 BASE) MCG/ACT inhaler Inhale 2 puffs into the lungs every 6 hours as needed for Wheezing 1 Inhaler 3    Elastic Bandages & Supports (B & B HERNIA BELT) MISC Use as needed for comfort 1 each 0    aspirin 81 MG EC tablet Take 1 tablet by mouth nightly. 30 tablet 3    Multiple Vitamins-Minerals (CENTRUM SILVER PO) Take 1 tablet by mouth daily. No current facility-administered medications for this visit.         Review of Systems:    Constitutional: negative  Eyes: negative  Ears, nose, mouth, throat, and face: negative  Respiratory: negative  Cardiovascular: negative  Gastrointestinal: negative  Genitourinary:negative  Integument/breast: negative  Hematologic/lymphatic: negative  Musculoskeletal:negative  Neurological: negative  Behavioral/Psych: negative  Endocrine: 02/16/2017     Lab Results   Component Value Date    BUN 21 04/23/2018    CREATININE 0.9 07/17/2018    CREATININE 0.8 04/23/2018       Image Review:     ECHO:3/2/15  Overall left ventricular function is normal.  Ejection fraction is visually estimated to be 30 to 35 %. Severe anterior and apical inferior wall hypokinesis  Left ventricle size is normal.  Normal left ventricular wall thickness. Normal right ventricular size and function. Cath:3/16/2015  ANGIOGRAPHIC FINDINGS: Right coronary comes from right coronary cusp. It is a dominant vessel giving rise to posterior descending artery, posterolateral branch, and distal portion with 90% stenosis and mid portion at least 80% to 85% stenosis present. INTERVENTION PERFORMED: We intervened on right coronary artery and placed a 2.5 x 18 mm drug-eluting Xience Alpine stent. In the midportion we placed a 2.75 x 33 mm drug-coated Xience Alpine stent with final stent diameter of 3.7 mm. ECHO:10/27/2015  Left ventricular size is normal .  Global ejection fraction is normal and estimated from 50 % to 60 %. moderate LVH  Normal right ventricular size and function. Moderate TR  Moderately dilated left atrium. ECHO:7/31/17  Summary   Left ventricle size is normal. Normal left ventricular wall thickness.   Ejection fraction is visually estimated to be 60-65 %. No regional wall   motion abnormalities are noted. Diastolic filling parameters suggests grade   I diastolic dysfunction .   The mitral valve leaflets are mildly thickened with normal leaflet mobility.   Trivial mitral regurgitation is present.   The left atrium is mildly dilated.   The aortic valve is mildly thickened, opens well with normal gradients.   There is mild tricuspid regurgitation with RVSP estimated at 58 mmHg. which   is mild to moderately elevated. Estimated right atrial pressure is 8 mmHg. Assessment/Plan:    -CAD  No angina.   --3/16/15 Status post RCA PCI   Continue ASA, plavix, lipitor, coreg  Superficial bruising noted   Daily walking program, 10 minutes, three times per day     -Cardiomyopathy with wall motion abnormality this has greatly improved after PCI. Last echo 7/30/17 LVEF 96-76%, grade I diastolic dysfunction  euvolemic on clinical examination today   Ankle edema off/on   On lasix therapy   Work on low sodium diet-she does report mindfulness daily snacking   Declines compression stockings daily   Walk daily for exercise     -HTN: Controlled today. Continue coreg and norvasc. Space BP medications a few hour apart daily. Low sodium diet and walking program    -PAD:  Claudication-aches with walking, will have to sit for a few minutes, uses motorized scooter while shopping  Distal aorta occlusion. I have encouraged daily walking routine up to 30 minutes daily-broken down   Pletal was discontinues secondary to hx of diastolic dysfunction   Lipitor with improvements in leg pain, fatigue. -CKD: Bilateral renal artery stenosis    -Squamous cell cancer Lung and as per Oncology she likely has stage 1A disease and chemotherapy is not indicated for this. Continues to follow with Dr. Izabela English appt 7/18, CT CHEST--do not see progress note. Will have sent over. -COPD: Last f/u the end of July. Remains on oxygen therapy at 2 L per NC. Per home pulse ox she is consistently in the 90's-has not follow up in a while. Recent hernia surgery per Dr. Caswell Scheuermann a few month back, feels she has recovered    She will follow up in 6 months. Thank you very much for allowing me to participate in the care of your patient. Please do not hesitate to contact me if you have any questions.     Sincerely,    Karmen Willard MD, MPH      [address]  Ph: [phone]  Fax: [phone]

## 2018-07-31 ENCOUNTER — OFFICE VISIT (OUTPATIENT)
Dept: FAMILY MEDICINE CLINIC | Age: 78
End: 2018-07-31

## 2018-07-31 ENCOUNTER — CARE COORDINATION (OUTPATIENT)
Dept: CARE COORDINATION | Age: 78
End: 2018-07-31

## 2018-07-31 VITALS
SYSTOLIC BLOOD PRESSURE: 189 MMHG | BODY MASS INDEX: 15.14 KG/M2 | OXYGEN SATURATION: 94 % | DIASTOLIC BLOOD PRESSURE: 64 MMHG | HEIGHT: 61 IN | HEART RATE: 63 BPM | WEIGHT: 80.2 LBS

## 2018-07-31 DIAGNOSIS — C34.12 MALIGNANT NEOPLASM OF UPPER LOBE OF LEFT LUNG (HCC): ICD-10-CM

## 2018-07-31 DIAGNOSIS — D64.9 ANEMIA, UNSPECIFIED TYPE: ICD-10-CM

## 2018-07-31 DIAGNOSIS — I10 ESSENTIAL HYPERTENSION, BENIGN: ICD-10-CM

## 2018-07-31 DIAGNOSIS — I25.10 CORONARY ARTERY DISEASE INVOLVING NATIVE CORONARY ARTERY OF NATIVE HEART WITHOUT ANGINA PECTORIS: ICD-10-CM

## 2018-07-31 DIAGNOSIS — I42.9 CARDIOMYOPATHY, UNSPECIFIED TYPE (HCC): Primary | ICD-10-CM

## 2018-07-31 DIAGNOSIS — J96.11 CHRONIC RESPIRATORY FAILURE WITH HYPOXIA (HCC): ICD-10-CM

## 2018-07-31 LAB
A/G RATIO: 1.9 (ref 1.1–2.2)
ALBUMIN SERPL-MCNC: 4.5 G/DL (ref 3.4–5)
ALP BLD-CCNC: 73 U/L (ref 40–129)
ALT SERPL-CCNC: 28 U/L (ref 10–40)
ANION GAP SERPL CALCULATED.3IONS-SCNC: 15 MMOL/L (ref 3–16)
AST SERPL-CCNC: 38 U/L (ref 15–37)
BILIRUB SERPL-MCNC: 0.4 MG/DL (ref 0–1)
BUN BLDV-MCNC: 23 MG/DL (ref 7–20)
CALCIUM SERPL-MCNC: 9.4 MG/DL (ref 8.3–10.6)
CHLORIDE BLD-SCNC: 104 MMOL/L (ref 99–110)
CHOLESTEROL, TOTAL: 121 MG/DL (ref 0–199)
CO2: 23 MMOL/L (ref 21–32)
CREAT SERPL-MCNC: 0.9 MG/DL (ref 0.6–1.2)
GFR AFRICAN AMERICAN: >60
GFR NON-AFRICAN AMERICAN: >60
GLOBULIN: 2.4 G/DL
GLUCOSE BLD-MCNC: 62 MG/DL (ref 70–99)
HDLC SERPL-MCNC: 53 MG/DL (ref 40–60)
LDL CHOLESTEROL CALCULATED: 53 MG/DL
POTASSIUM SERPL-SCNC: 4.3 MMOL/L (ref 3.5–5.1)
REASON FOR REJECTION: NORMAL
REJECTED TEST: NORMAL
SODIUM BLD-SCNC: 142 MMOL/L (ref 136–145)
TOTAL PROTEIN: 6.9 G/DL (ref 6.4–8.2)
TRIGL SERPL-MCNC: 75 MG/DL (ref 0–150)
VITAMIN B-12: 868 PG/ML (ref 211–911)
VLDLC SERPL CALC-MCNC: 15 MG/DL

## 2018-07-31 PROCEDURE — 1090F PRES/ABSN URINE INCON ASSESS: CPT | Performed by: FAMILY MEDICINE

## 2018-07-31 PROCEDURE — G8419 CALC BMI OUT NRM PARAM NOF/U: HCPCS | Performed by: FAMILY MEDICINE

## 2018-07-31 PROCEDURE — G8427 DOCREV CUR MEDS BY ELIG CLIN: HCPCS | Performed by: FAMILY MEDICINE

## 2018-07-31 PROCEDURE — 36415 COLL VENOUS BLD VENIPUNCTURE: CPT | Performed by: FAMILY MEDICINE

## 2018-07-31 PROCEDURE — G8400 PT W/DXA NO RESULTS DOC: HCPCS | Performed by: FAMILY MEDICINE

## 2018-07-31 PROCEDURE — 4040F PNEUMOC VAC/ADMIN/RCVD: CPT | Performed by: FAMILY MEDICINE

## 2018-07-31 PROCEDURE — 3017F COLORECTAL CA SCREEN DOC REV: CPT | Performed by: FAMILY MEDICINE

## 2018-07-31 PROCEDURE — 1123F ACP DISCUSS/DSCN MKR DOCD: CPT | Performed by: FAMILY MEDICINE

## 2018-07-31 PROCEDURE — G8598 ASA/ANTIPLAT THER USED: HCPCS | Performed by: FAMILY MEDICINE

## 2018-07-31 PROCEDURE — 1101F PT FALLS ASSESS-DOCD LE1/YR: CPT | Performed by: FAMILY MEDICINE

## 2018-07-31 PROCEDURE — 1036F TOBACCO NON-USER: CPT | Performed by: FAMILY MEDICINE

## 2018-07-31 PROCEDURE — 99215 OFFICE O/P EST HI 40 MIN: CPT | Performed by: FAMILY MEDICINE

## 2018-07-31 ASSESSMENT — ENCOUNTER SYMPTOMS
COLOR CHANGE: 0
RHINORRHEA: 0
CONSTIPATION: 0
ABDOMINAL PAIN: 0
EYE PAIN: 0
SORE THROAT: 0
COUGH: 0
DIARRHEA: 0
NAUSEA: 0
SHORTNESS OF BREATH: 1
VOMITING: 0

## 2018-07-31 ASSESSMENT — PATIENT HEALTH QUESTIONNAIRE - PHQ9
SUM OF ALL RESPONSES TO PHQ QUESTIONS 1-9: 0
2. FEELING DOWN, DEPRESSED OR HOPELESS: 0
SUM OF ALL RESPONSES TO PHQ9 QUESTIONS 1 & 2: 0
SUM OF ALL RESPONSES TO PHQ QUESTIONS 1-9: 0
1. LITTLE INTEREST OR PLEASURE IN DOING THINGS: 0

## 2018-07-31 NOTE — CARE COORDINATION
Ambulatory Care Coordination Note  7/31/2018  CM Risk Score: 11  Kristina Mortality Risk Score: 43.57    ACC: Anupama Rdz, RN    Summary Note: RNCC met with Matthew Carrasco prior to her appt with . Matthew Carrasco has a pallor appearance. She denies increased shortness of breath. Matthew Carrasco is monitoring her pulse ox at home and reports reading is 98% on 2 L O2 at rest.  She denies cough and swelling. She was seen recently by . Her CT scan was stable. She will be due in December for a repeat scan. Matthew Carrasco denies changes in her bowels. Her BM have been normal.  Her weight has been stable. Her appetite is unchanged. She denies falls and near falls. Action: Emphasized the importance of monitoring symptoms and following COPD zones. Demonstrated pursed lip breathing. Provided notes and labs to  from visit with . Aware of same day appt availability. Praised pulse ox monitoring. Discussed parameters with pulse ox. PLAN:  F/u on labs  Next visit: code status   F/u monitoring symptoms  F/u call in one month        Care Coordination Interventions    Program Enrollment:  Complex Care  Referral from Primary Care Provider:  No  Suggested Interventions and Community Resources  Fall Risk Prevention:  Completed  Home Health Services:  Completed  Meals on Wheels:  Declined  Registered Dietician:  [address] [place] [address] Avenue: In Process (Comment: [facility] referral- [facility])  Zone Management Tools: In Process         Goals Addressed             Most Recent     Conditions and Symptoms   On track (7/31/2018)             I will follow my Zone Management tool to seek urgent or emergent care.     Barriers: none  Plan for overcoming my barriers: N/A  Confidence: 8/10  Anticipated Goal Completion Date: continuous         Self Monitoring   On track (7/31/2018)             Daily Weights - I will notify my provider of any increase in weight by 3 or more pounds in 2 days OR 5 or more pounds in a

## 2018-07-31 NOTE — PROGRESS NOTES
Chief Complaint   Patient presents with    Cardiomyopathy    Other     Respiratory failure w/ Hypoxia    Lung Cancer    Hypertension    Coronary Artery Disease       HPI:Rosalva Dickerson presents for evaluation and management of Multiple medical problems as a highly comorbid complicated and at risk patient. She saw Dr. Baca Sep July 18. She had a recent CT scan that showed her lung cancer has been stable and is continuing with active surveillance. It was noted however that her anemia was a little bit worse at that time with elevated red blood cell volume indices. She notes her weight has stable bowel habits have been normal and appetite has been pretty good. She reports she checks her home blood pressures and her heart rate is consistently less than 65. She is compliant with her blood pressure medications and notes no lightheadedness or dizziness. She states her shortness of breath and dyspnea are pretty good she only has some dyspnea with exertion on a variable basis. She is compliant with her oxygen and O2 sats her consistently 98% on 2 L per nasal cannula. She recently followed up with Dr. Cathy Gaytan for her cardiomyopathy which is significantly improved following her percutaneous intervention. She lives alone has a life alert and is driving. Review of Systems   Constitutional: Negative for chills and fever. HENT: Positive for ear pain. Negative for rhinorrhea and sore throat. Some right ear pain   Eyes: Negative for pain and visual disturbance. Respiratory: Positive for shortness of breath. Negative for cough. Cardiovascular: Negative for chest pain and palpitations. Gastrointestinal: Negative for abdominal pain, constipation, diarrhea, nausea and vomiting. Genitourinary: Negative for dysuria and frequency. Musculoskeletal: Negative for joint swelling and myalgias. Skin: Negative for color change and rash. Neurological: Negative for weakness, numbness and headaches. Hematological: Negative for adenopathy. Does not bruise/bleed easily. Psychiatric/Behavioral: Negative for dysphoric mood, self-injury and suicidal ideas. The patient is not nervous/anxious. Allergies   Allergen Reactions    Ace Inhibitors      Can not be on ACEI or ARB therapy due to Renal artery stenosis     New Prescriptions    No medications on file     Current Outpatient Prescriptions   Medication Sig Dispense Refill    nortriptyline (PAMELOR) 10 MG capsule TAKE 1 CAPSULE BY MOUTH NIGHTLY 30 capsule 5    amLODIPine (NORVASC) 5 MG tablet Take 1 tablet by mouth daily 30 tablet 3    carvedilol (COREG) 12.5 MG tablet Take 1 tablet by mouth 2 times daily (with meals) (Patient taking differently: Take 12.5 mg by mouth 2 times daily (with meals) Advised at hospital to hold evening dose if pulse < 65) 60 tablet 3    clopidogrel (PLAVIX) 75 MG tablet TAKE ONE TABLET BY MOUTH DAILY. 30 tablet 5    omeprazole (PRILOSEC) 20 MG delayed release capsule Take 1 capsule by mouth daily 30 capsule 5    fluticasone-salmeterol (ADVAIR HFA) 230-21 MCG/ACT inhaler INHALE 2 PUFFS INTO THE LUNGS 2 TIMES DAILY 36 g 3    potassium chloride (KLOR-CON M) 20 MEQ extended release tablet TAKE ONE TABLET BY MOUTH EVERY OTHER DAY WITH LASIX (GENERIC NAME IS FUROSEMIDE).  90 tablet 3    furosemide (LASIX) 20 MG tablet TAKE 1 TABLET EVERY OTHER DAY 30 tablet 5    atorvastatin (LIPITOR) 80 MG tablet TAKE 1 TABLET BY MOUTH NIGHTLY 90 tablet 3    Compression Stockings MISC by Does not apply route Bilateral knee high compression stockings, 15-20 mmHg daily for leg swelling 2 each 1    sennosides-docusate sodium (SENOKOT-S) 8.6-50 MG tablet Take 1 tablet by mouth 2 times daily (Patient taking differently: Take 1 tablet by mouth as needed ) 60 tablet 11    Handicap Placard MISC by Does not apply route Duration 5 years 1 each 0    albuterol sulfate HFA (PROAIR HFA) 108 (90 BASE) MCG/ACT inhaler Inhale 2 puffs into the lungs every 6 hours as needed for Wheezing 1 Inhaler 3    Elastic Bandages & Supports (B & B HERNIA BELT) MISC Use as needed for comfort 1 each 0    aspirin 81 MG EC tablet Take 1 tablet by mouth nightly. 30 tablet 3    Multiple Vitamins-Minerals (CENTRUM SILVER PO) Take 1 tablet by mouth daily. No current facility-administered medications for this visit.         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 Ayla Garcia    Carotid stenosis 07/2014    bilat 50-79% 9/2014    Chronic respiratory failure with hypoxia ([address]     HOME O2    COPD (chronic obstructive pulmonary disease) ([address] 72/1938    Diastolic CHF ([address] 69/5527 7/2017 ECHO = EF 60% + GRADE I DD    HTN (hypertension)     Incarcerated right inguinal hernia 08/2017    REDUCED IN ED    Left inguinal hernia     Macular hemorrhage of left eye 02/20/2017    Dr. Percy Ferreira NSTEMI (non-ST elevated myocardial infarction) [facility])     Occluded distal aorta ([address]     PAD (peripheral artery disease) ([address]     SEVERE    Pulmonary HTN 07/2017 7/2017 ECHO = RVSP @ 62 + MILD TR    ZI (renal artery stenosis) ([address]     SBO (small bowel obstruction) 08/2017    2/2 HERNIA  and 4/21/18    Small cell lung cancer ([address] 09/15/2014    SCCA 12 mm :  Dr. Pham Payer : Ruthine Brunner  /  (R) WEDGE RESECTION  /  8/2015 RECURRANCE W/ NORA STEROTATIC RADIOSURGERY    Wears glasses     reading    Weight loss     25 lbs over last 4 yrs     Past Surgical History:   Procedure Laterality Date    CATARACT REMOVAL Bilateral 2011    Dr. Josef Cano Right 08/2017    Dr. Ailin Roe Left 04/21/2018    Dr. Ankita Tobias Right     THORACOSCOPY Right 9/15/14    wedge excision RLL sounds normal. She has no wheezes. She has no rales. Abdominal: Soft. Bowel sounds are normal. She exhibits no distension. There is no splenomegaly or hepatomegaly. There is no tenderness. There is no rebound and no guarding. Musculoskeletal: Normal range of motion. She exhibits no tenderness or deformity. Lymphadenopathy:     She has no cervical adenopathy. Neurological: She is alert. She has normal strength. No cranial nerve deficit or sensory deficit. Gait normal.   Skin: Skin is warm and dry. No rash noted. No erythema. Psychiatric: She has a normal mood and affect. Her speech is normal and behavior is normal.   Vitals reviewed. Chemistry        Component Value Date/Time     04/23/2018 0450    K 4.2 04/23/2018 0450    K 4.5 04/21/2018 0421    CL 99 04/23/2018 0450    CO2 23 04/23/2018 0450    BUN 21 (H) 04/23/2018 0450    CREATININE 0.9 07/17/2018 1026    CREATININE 0.8 04/23/2018 0450        Component Value Date/Time    CALCIUM 7.5 (L) 04/23/2018 0450    ALKPHOS 42 04/23/2018 0450    AST 36 04/23/2018 0450    ALT 12 04/23/2018 0450    BILITOT 0.4 04/23/2018 0450          Lab Results   Component Value Date    WBC 8.8 04/23/2018    HGB 11.1 (L) 04/23/2018    HCT 32.6 (L) 04/23/2018    MCV 97.2 04/23/2018     04/23/2018     No results found for: LABA1C  No results found for: EAG  No results found for: LABA1C  No components found for: CHLPL  Lab Results   Component Value Date    TRIG 46 02/16/2017    TRIG 74 04/01/2016    TRIG 164 (H) 03/16/2015     Lab Results   Component Value Date    HDL 59 02/16/2017    HDL 52 04/01/2016    HDL 38 (L) 03/16/2015     Lab Results   Component Value Date    LDLCALC 53 02/16/2017    LDLCALC 56 04/01/2016    LDLCALC 53 03/16/2015     Lab Results   Component Value Date    LABVLDL 9 02/16/2017    LABVLDL 15 04/01/2016    LABVLDL 33 03/16/2015         Assessment   Plan     1.  Cardiomyopathy, unspecified type ([address]  Ischemic in nature: Significantly improved

## 2018-08-01 ENCOUNTER — TELEPHONE (OUTPATIENT)
Dept: FAMILY MEDICINE CLINIC | Age: 78
End: 2018-08-01

## 2018-08-01 DIAGNOSIS — D64.9 ANEMIA, UNSPECIFIED TYPE: ICD-10-CM

## 2018-08-01 DIAGNOSIS — D64.9 ANEMIA, UNSPECIFIED TYPE: Primary | ICD-10-CM

## 2018-08-01 LAB — FOLATE: >20 NG/ML (ref 4.78–24.2)

## 2018-08-06 ENCOUNTER — CARE COORDINATION (OUTPATIENT)
Dept: CARE COORDINATION | Age: 78
End: 2018-08-06

## 2018-08-06 NOTE — CARE COORDINATION
TAKE 1 CAPSULE BY MOUTH NIGHTLY 6/13/18   Augustin Marie MD   amLODIPine (NORVASC) 5 MG tablet Take 1 tablet by mouth daily 4/24/18   Benson Samano MD   carvedilol (COREG) 12.5 MG tablet Take 1 tablet by mouth 2 times daily (with meals)  Patient taking differently: Take 12.5 mg by mouth 2 times daily (with meals) Advised at hospital to hold evening dose if pulse < 65 4/24/18   Benson Samano MD   clopidogrel (PLAVIX) 75 MG tablet TAKE ONE TABLET BY MOUTH DAILY. 4/2/18   Boris Taylor MD   omeprazole (PRILOSEC) 20 MG delayed release capsule Take 1 capsule by mouth daily 1/11/18   Augustin Marie MD   fluticasone-salmeterol (ADVAIR HFA) 802-77 MCG/ACT inhaler INHALE 2 PUFFS INTO THE LUNGS 2 TIMES DAILY 9/12/17   Augustin Marie MD   potassium chloride (KLOR-CON M) 20 MEQ extended release tablet TAKE ONE TABLET BY MOUTH EVERY OTHER DAY WITH LASIX (GENERIC NAME IS FUROSEMIDE). 9/12/17   Augustin Marie MD   furosemide (LASIX) 20 MG tablet TAKE 1 TABLET EVERY OTHER DAY 9/12/17   Augustin Marie MD   atorvastatin (LIPITOR) 80 MG tablet TAKE 1 TABLET BY MOUTH NIGHTLY 9/12/17   Augustin Marie MD   Compression Stockings MISC by Does not apply route Bilateral knee high compression stockings, 15-20 mmHg daily for leg swelling 8/17/17   Bry Martinez MD   sennosides-docusate sodium (SENOKOT-S) 8.6-50 MG tablet Take 1 tablet by mouth 2 times daily  Patient taking differently: Take 1 tablet by mouth as needed  8/11/17 8/11/18  Augustin Marie MD   Handicap Placard MISC by Does not apply route Duration 5 years 10/6/16   Augustin Marie MD   albuterol sulfate HFA (PROAIR HFA) 108 (90 BASE) MCG/ACT inhaler Inhale 2 puffs into the lungs every 6 hours as needed for Wheezing 9/14/16   Augustin Marie MD   Elastic Bandages & Supports (B & B HERNIA BELT) MISC Use as needed for comfort 5/7/15   Augustin Marie MD   aspirin 81 MG EC tablet Take 1 tablet by mouth nightly.  3/18/15   Ramón Coe MD   Multiple Vitamins-Minerals (CENTRUM SILVER PO) Take 1 tablet by mouth daily.     Historical Provider, MD       Future Appointments  Date Time Provider Rakesh Jacobson   11/6/2018 10:15 AM Norris Snider MD DENT  [facility]     ,   Congestive Heart Failure Assessment    Are you currently restricting fluids?:  No Restriction  Do you understand a low sodium diet?:  Yes  Do you understand how to read food labels?:  No  How many restaurant meals do you eat per week?:  1-2  Do you salt your food before tasting it?:  Yes         Symptoms:   CHF associated angina: Neg, CHF associated dyspnea on exertion: Pos, CHF associated fatigue: Neg, CHF associated leg swelling: Neg, CHF associated orthostatic hypotension: Neg, CHF associated PND: Neg, CHF associated shortness of breath: Neg, CHF associated weakness: Neg      Symptom course:  stable  Patient-reported weight (lb):  80  Weight trend:  stable  Salt intake watch compared to last visit:  stable      and   COPD Assessment    Does the patient understand envrionmental exposure?:  Yes  Is the patient able to verbalize Rescue vs. Long Acting medications?:  Yes  Does the patient have a nebulizer?:  No  Does the patient use a space with inhaled medications?:  Yes     No patient-reported symptoms         Symptoms:

## 2018-08-16 RX ORDER — DOCUSATE SODIUM -SENNOSIDES 50; 8.6 MG/1; MG/1
TABLET, COATED ORAL
Qty: 60 TABLET | Refills: 11 | Status: SHIPPED | OUTPATIENT
Start: 2018-08-16 | End: 2019-07-23

## 2018-08-16 RX ORDER — AMLODIPINE BESYLATE 5 MG/1
5 TABLET ORAL DAILY
Qty: 90 TABLET | Refills: 3 | Status: SHIPPED | OUTPATIENT
Start: 2018-08-16 | End: 2019-02-08 | Stop reason: SDUPTHER

## 2018-08-16 RX ORDER — CARVEDILOL 12.5 MG/1
12.5 TABLET ORAL 2 TIMES DAILY WITH MEALS
Qty: 180 TABLET | Refills: 3 | Status: SHIPPED | OUTPATIENT
Start: 2018-08-16 | End: 2019-08-12 | Stop reason: SDUPTHER

## 2018-09-10 DIAGNOSIS — I25.10 CORONARY ARTERY DISEASE INVOLVING NATIVE CORONARY ARTERY OF NATIVE HEART WITHOUT ANGINA PECTORIS: ICD-10-CM

## 2018-09-10 DIAGNOSIS — I73.9 PAD (PERIPHERAL ARTERY DISEASE) (HCC): ICD-10-CM

## 2018-09-10 DIAGNOSIS — K92.2 GASTROINTESTINAL HEMORRHAGE, UNSPECIFIED GASTROINTESTINAL HEMORRHAGE TYPE: Primary | ICD-10-CM

## 2018-09-10 RX ORDER — OMEPRAZOLE 20 MG/1
20 CAPSULE, DELAYED RELEASE ORAL DAILY
Qty: 90 CAPSULE | Refills: 3 | Status: SHIPPED | OUTPATIENT
Start: 2018-09-10 | End: 2019-08-12 | Stop reason: SDUPTHER

## 2018-09-10 RX ORDER — ATORVASTATIN CALCIUM 80 MG/1
TABLET, FILM COATED ORAL
Qty: 90 TABLET | Refills: 3 | Status: SHIPPED | OUTPATIENT
Start: 2018-09-10 | End: 2019-08-12 | Stop reason: SDUPTHER

## 2018-09-10 RX ORDER — CLOPIDOGREL BISULFATE 75 MG/1
TABLET ORAL
Qty: 90 TABLET | Refills: 3 | Status: SHIPPED | OUTPATIENT
Start: 2018-09-10 | End: 2019-08-12 | Stop reason: SDUPTHER

## 2018-09-13 ENCOUNTER — TELEPHONE (OUTPATIENT)
Dept: PRIMARY CARE CLINIC | Age: 78
End: 2018-09-13

## 2018-10-08 ENCOUNTER — CARE COORDINATION (OUTPATIENT)
Dept: CARE COORDINATION | Age: 78
End: 2018-10-08

## 2018-10-10 ENCOUNTER — TELEPHONE (OUTPATIENT)
Dept: FAMILY MEDICINE CLINIC | Age: 78
End: 2018-10-10

## 2018-10-26 ENCOUNTER — HOSPITAL ENCOUNTER (OUTPATIENT)
Age: 78
Discharge: HOME | End: 2018-10-26
Payer: MEDICARE

## 2018-10-26 ENCOUNTER — OFFICE VISIT (OUTPATIENT)
Dept: PULMONOLOGY | Age: 78
End: 2018-10-26
Payer: MEDICARE

## 2018-10-26 ENCOUNTER — TELEPHONE (OUTPATIENT)
Dept: FAMILY MEDICINE CLINIC | Age: 78
End: 2018-10-26

## 2018-10-26 ENCOUNTER — HOSPITAL ENCOUNTER (OUTPATIENT)
Dept: GENERAL RADIOLOGY | Age: 78
Discharge: HOME | End: 2018-10-26
Payer: MEDICARE

## 2018-10-26 VITALS
HEIGHT: 61 IN | RESPIRATION RATE: 26 BRPM | SYSTOLIC BLOOD PRESSURE: 147 MMHG | DIASTOLIC BLOOD PRESSURE: 68 MMHG | BODY MASS INDEX: 14.73 KG/M2 | HEART RATE: 61 BPM | WEIGHT: 78 LBS | TEMPERATURE: 97.6 F

## 2018-10-26 DIAGNOSIS — R07.89 OTHER CHEST PAIN: ICD-10-CM

## 2018-10-26 DIAGNOSIS — J43.2 CENTRILOBULAR EMPHYSEMA (HCC): Primary | ICD-10-CM

## 2018-10-26 DIAGNOSIS — J96.11 CHRONIC RESPIRATORY FAILURE WITH HYPOXIA (HCC): ICD-10-CM

## 2018-10-26 PROCEDURE — G8926 SPIRO NO PERF OR DOC: HCPCS | Performed by: INTERNAL MEDICINE

## 2018-10-26 PROCEDURE — 71046 X-RAY EXAM CHEST 2 VIEWS: CPT

## 2018-10-26 PROCEDURE — G8484 FLU IMMUNIZE NO ADMIN: HCPCS | Performed by: INTERNAL MEDICINE

## 2018-10-26 PROCEDURE — 4040F PNEUMOC VAC/ADMIN/RCVD: CPT | Performed by: INTERNAL MEDICINE

## 2018-10-26 PROCEDURE — G8598 ASA/ANTIPLAT THER USED: HCPCS | Performed by: INTERNAL MEDICINE

## 2018-10-26 PROCEDURE — 3023F SPIROM DOC REV: CPT | Performed by: INTERNAL MEDICINE

## 2018-10-26 PROCEDURE — 1036F TOBACCO NON-USER: CPT | Performed by: INTERNAL MEDICINE

## 2018-10-26 PROCEDURE — G8427 DOCREV CUR MEDS BY ELIG CLIN: HCPCS | Performed by: INTERNAL MEDICINE

## 2018-10-26 PROCEDURE — 1101F PT FALLS ASSESS-DOCD LE1/YR: CPT | Performed by: INTERNAL MEDICINE

## 2018-10-26 PROCEDURE — G8419 CALC BMI OUT NRM PARAM NOF/U: HCPCS | Performed by: INTERNAL MEDICINE

## 2018-10-26 PROCEDURE — 1123F ACP DISCUSS/DSCN MKR DOCD: CPT | Performed by: INTERNAL MEDICINE

## 2018-10-26 PROCEDURE — 3017F COLORECTAL CA SCREEN DOC REV: CPT | Performed by: INTERNAL MEDICINE

## 2018-10-26 PROCEDURE — 1090F PRES/ABSN URINE INCON ASSESS: CPT | Performed by: INTERNAL MEDICINE

## 2018-10-26 PROCEDURE — G8400 PT W/DXA NO RESULTS DOC: HCPCS | Performed by: INTERNAL MEDICINE

## 2018-10-26 PROCEDURE — 99214 OFFICE O/P EST MOD 30 MIN: CPT | Performed by: INTERNAL MEDICINE

## 2018-10-26 NOTE — PROGRESS NOTES
Date    CATARACT REMOVAL Bilateral 2011    Dr. Elsa Marcum Right 08/2017    Dr. Chato Gibson Left 04/21/2018    Dr. Tay Gaines Right 9/15/14    wedge excision RLL nodule - squamous cell    THORACOTOMY Right 9/15/14    Right VATS with wedge excision right lower lung nodule:  Dr. Emilio Black EXTRACTION  age 27    UPPER GASTROINTESTINAL ENDOSCOPY  4-20-15       FAMILY HISTORY:  family history includes Cancer in her mother; Diabetes in her father; Heart Attack in her father; Heart Disease in her mother. SOCIAL HISTORY:   reports that she quit smoking about 4 years ago. Her smoking use included Cigarettes. She has a 53.00 pack-year smoking history. She has never used smokeless tobacco.    ALLERGIES:  Patient is allergic to ace inhibitors. REVIEW OF SYSTEMS:  Constitutional: Negative for fever    HENT: Negative for sore throat  Eyes: Negative for redness   Respiratory: ++ for dyspnea, cough  Cardiovascular: Negative for chest pain  Gastrointestinal: Negative for vomiting, diarrhea   Genitourinary: Negative for hematuria   Musculoskeletal: Negative for arthralgias   Skin: Negative for rash  Neurological: Negative for syncope  Hematological: Negative for adenopathy  Psychiatric/Behavorial: Negative for anxiety    Objective:   PHYSICAL EXAM:  Blood pressure (!) 147/68, pulse 61, temperature 97.6 °F (36.4 °C), temperature source Oral, resp. rate 26, height 5' 1\" (1.549 m), weight 78 lb (35.4 kg), not currently breastfeeding.'  Gen: No distress. thin  Eyes: PERRL. No sclera icterus. No conjunctival injection. ENT: No discharge. Pharynx clear. External appearance of ears and nose normal.  Neck: Trachea midline. No obvious mass. Resp: No accessory muscle use. No crackles. No wheezes. No rhonchi. No dullness on percussion. Increased AP diameter. CV: Regular rate.

## 2018-10-26 NOTE — TELEPHONE ENCOUNTER
Attempted to contact patient on 10/26/2018. Result: left message on the patient's voicemail asking patient to return my call. Pre-Visit planning not completed.

## 2018-11-02 ENCOUNTER — OFFICE VISIT (OUTPATIENT)
Dept: FAMILY MEDICINE CLINIC | Age: 78
End: 2018-11-02
Payer: MEDICARE

## 2018-11-02 VITALS
DIASTOLIC BLOOD PRESSURE: 64 MMHG | HEIGHT: 61 IN | BODY MASS INDEX: 15.11 KG/M2 | WEIGHT: 80 LBS | SYSTOLIC BLOOD PRESSURE: 138 MMHG | HEART RATE: 62 BPM

## 2018-11-02 DIAGNOSIS — I25.10 CORONARY ARTERY DISEASE INVOLVING NATIVE CORONARY ARTERY OF NATIVE HEART WITHOUT ANGINA PECTORIS: Primary | ICD-10-CM

## 2018-11-02 DIAGNOSIS — Z12.11 SCREEN FOR COLON CANCER: ICD-10-CM

## 2018-11-02 DIAGNOSIS — J43.2 CENTRILOBULAR EMPHYSEMA (HCC): ICD-10-CM

## 2018-11-02 DIAGNOSIS — I73.9 PERIPHERAL VASCULAR DISEASE (HCC): ICD-10-CM

## 2018-11-02 DIAGNOSIS — I10 ESSENTIAL HYPERTENSION, BENIGN: ICD-10-CM

## 2018-11-02 DIAGNOSIS — Z23 NEED FOR VACCINATION: ICD-10-CM

## 2018-11-02 DIAGNOSIS — C34.12 MALIGNANT NEOPLASM OF UPPER LOBE OF LEFT LUNG (HCC): ICD-10-CM

## 2018-11-02 PROCEDURE — G8482 FLU IMMUNIZE ORDER/ADMIN: HCPCS | Performed by: FAMILY MEDICINE

## 2018-11-02 PROCEDURE — 4040F PNEUMOC VAC/ADMIN/RCVD: CPT | Performed by: FAMILY MEDICINE

## 2018-11-02 PROCEDURE — 1090F PRES/ABSN URINE INCON ASSESS: CPT | Performed by: FAMILY MEDICINE

## 2018-11-02 PROCEDURE — G8400 PT W/DXA NO RESULTS DOC: HCPCS | Performed by: FAMILY MEDICINE

## 2018-11-02 PROCEDURE — 99215 OFFICE O/P EST HI 40 MIN: CPT | Performed by: FAMILY MEDICINE

## 2018-11-02 PROCEDURE — G8598 ASA/ANTIPLAT THER USED: HCPCS | Performed by: FAMILY MEDICINE

## 2018-11-02 PROCEDURE — 3023F SPIROM DOC REV: CPT | Performed by: FAMILY MEDICINE

## 2018-11-02 PROCEDURE — G0008 ADMIN INFLUENZA VIRUS VAC: HCPCS | Performed by: FAMILY MEDICINE

## 2018-11-02 PROCEDURE — 1036F TOBACCO NON-USER: CPT | Performed by: FAMILY MEDICINE

## 2018-11-02 PROCEDURE — G8427 DOCREV CUR MEDS BY ELIG CLIN: HCPCS | Performed by: FAMILY MEDICINE

## 2018-11-02 PROCEDURE — 1123F ACP DISCUSS/DSCN MKR DOCD: CPT | Performed by: FAMILY MEDICINE

## 2018-11-02 PROCEDURE — 3017F COLORECTAL CA SCREEN DOC REV: CPT | Performed by: FAMILY MEDICINE

## 2018-11-02 PROCEDURE — 90682 RIV4 VACC RECOMBINANT DNA IM: CPT | Performed by: FAMILY MEDICINE

## 2018-11-02 PROCEDURE — G8419 CALC BMI OUT NRM PARAM NOF/U: HCPCS | Performed by: FAMILY MEDICINE

## 2018-11-02 PROCEDURE — 1101F PT FALLS ASSESS-DOCD LE1/YR: CPT | Performed by: FAMILY MEDICINE

## 2018-11-02 PROCEDURE — G8926 SPIRO NO PERF OR DOC: HCPCS | Performed by: FAMILY MEDICINE

## 2018-11-02 RX ORDER — TRAMADOL HYDROCHLORIDE 50 MG/1
50 TABLET ORAL EVERY 6 HOURS PRN
Status: ON HOLD | COMMUNITY
End: 2019-07-21

## 2018-11-02 ASSESSMENT — ENCOUNTER SYMPTOMS
DIARRHEA: 0
RHINORRHEA: 0
COUGH: 0
ABDOMINAL PAIN: 0
EYE PAIN: 0
WHEEZING: 1
COLOR CHANGE: 1
VOMITING: 0
NAUSEA: 0
CONSTIPATION: 0
SHORTNESS OF BREATH: 1
SORE THROAT: 0

## 2018-11-02 NOTE — PROGRESS NOTES
 THORACOSCOPY Right 9/15/14    wedge excision RLL nodule - squamous cell    THORACOTOMY Right 9/15/14    Right VATS with wedge excision right lower lung nodule:  Dr. Ronaldo Gipson EXTRACTION  age 26    UPPER GASTROINTESTINAL ENDOSCOPY  4-20-15     Family History   Problem Relation Age of Onset    Cancer Mother         lymphoma  in 68's    Heart Disease Mother     Heart Attack Father         late onset    Diabetes Father      Social History   Substance Use Topics    Smoking status: Former Smoker     Packs/day: 1.00     Years: 53.00     Types: Cigarettes     Quit date: 9/15/2014    Smokeless tobacco: Never Used      Comment: abstaining.  Alcohol use No       Objective   /64   Pulse 62   Ht 5' 1\" (1.549 m)   Wt 80 lb (36.3 kg)   BMI 15.12 kg/m²   Wt Readings from Last 3 Encounters:   18 80 lb (36.3 kg)   10/26/18 78 lb (35.4 kg)   18 80 lb 3.2 oz (36.4 kg)       Physical Exam   Constitutional: She appears well-developed and well-nourished. Frail pale white female with blue discoloration of bilateral fingertips   HENT:   Head: Normocephalic and atraumatic. Nose: Nose normal.   Mouth/Throat: No oropharyngeal exudate. Full upper and lower dentureTMs negative bilaterally, canals patent, nasal mucosa pink and patent,  Oropharynx pink and patent   Eyes: Pupils are equal, round, and reactive to light. Right eye exhibits no discharge. Left eye exhibits no discharge. No scleral icterus. Neck: Normal range of motion. Neck supple. No thyromegaly present. Cardiovascular: Normal rate, regular rhythm and intact distal pulses. Exam reveals no gallop and no friction rub. Murmur heard. Systolic murmur is present with a grade of 2/6   Pulses:       Dorsalis pedis pulses are 1+ on the right side, and 1+ on the left side. Posterior tibial pulses are 1+ on the right side, and 1+ on the left side. No Edema Lower Extremities   Pulmonary/Chest: Effort normal. She has no rales.

## 2018-11-12 DIAGNOSIS — J43.2 CENTRILOBULAR EMPHYSEMA (HCC): ICD-10-CM

## 2018-11-12 DIAGNOSIS — I10 ESSENTIAL HYPERTENSION, BENIGN: ICD-10-CM

## 2018-11-12 RX ORDER — FUROSEMIDE 20 MG/1
TABLET ORAL
Qty: 30 TABLET | Refills: 3 | Status: SHIPPED | OUTPATIENT
Start: 2018-11-12 | End: 2019-07-22 | Stop reason: SDUPTHER

## 2018-11-12 RX ORDER — FLUTICASONE PROPIONATE AND SALMETEROL XINAFOATE 230; 21 UG/1; UG/1
AEROSOL, METERED RESPIRATORY (INHALATION)
Qty: 36 G | Refills: 3 | Status: SHIPPED | OUTPATIENT
Start: 2018-11-12

## 2018-11-12 RX ORDER — NORTRIPTYLINE HYDROCHLORIDE 10 MG/1
10 CAPSULE ORAL NIGHTLY
Qty: 30 CAPSULE | Refills: 3 | Status: SHIPPED | OUTPATIENT
Start: 2018-11-12 | End: 2019-02-08

## 2018-12-03 ENCOUNTER — CARE COORDINATION (OUTPATIENT)
Dept: CARE COORDINATION | Age: 78
End: 2018-12-03

## 2018-12-18 ENCOUNTER — HOSPITAL ENCOUNTER (OUTPATIENT)
Dept: CT IMAGING | Age: 78
Discharge: HOME | End: 2018-12-18
Payer: MEDICARE

## 2018-12-18 DIAGNOSIS — C34.12 MALIGNANT NEOPLASM OF UPPER LOBE BRONCHUS, LEFT (HCC): ICD-10-CM

## 2018-12-18 LAB
GFR AFRICAN AMERICAN: >60
GFR NON-AFRICAN AMERICAN: >60
PERFORMED ON: NORMAL
POC CREATININE: 0.8 MG/DL (ref 0.6–1.2)
POC SAMPLE TYPE: NORMAL

## 2018-12-18 PROCEDURE — 71260 CT THORAX DX C+: CPT

## 2018-12-18 PROCEDURE — 82565 ASSAY OF CREATININE: CPT

## 2018-12-18 PROCEDURE — 6360000004 HC RX CONTRAST MEDICATION: Performed by: INTERNAL MEDICINE

## 2018-12-18 RX ADMIN — IOVERSOL 75 ML: 678 INJECTION INTRA-ARTERIAL; INTRAVENOUS at 13:24

## 2018-12-19 LAB
ALBUMIN SERPL-MCNC: 3.5 G/DL
ALP BLD-CCNC: 80 U/L
ALT SERPL-CCNC: 35 U/L
ANION GAP SERPL CALCULATED.3IONS-SCNC: NORMAL MMOL/L
AST SERPL-CCNC: 41 U/L
BASOPHILS ABSOLUTE: 0.01 /ΜL
BASOPHILS RELATIVE PERCENT: 0.1 %
BILIRUB SERPL-MCNC: 0.7 MG/DL (ref 0.1–1.4)
BUN BLDV-MCNC: 16 MG/DL
CALCIUM SERPL-MCNC: 9.4 MG/DL
CHLORIDE BLD-SCNC: 102 MMOL/L
CO2: 27 MMOL/L
CREAT SERPL-MCNC: 1 MG/DL
EOSINOPHILS ABSOLUTE: 0.21 /ΜL
EOSINOPHILS RELATIVE PERCENT: 1.8 %
GFR CALCULATED: 53.9
GLUCOSE BLD-MCNC: 92 MG/DL
HCT VFR BLD CALC: 39.8 % (ref 36–46)
HEMOGLOBIN: 12.8 G/DL (ref 12–16)
LYMPHOCYTES ABSOLUTE: 1.19 /ΜL
LYMPHOCYTES RELATIVE PERCENT: 10.2 %
MCH RBC QN AUTO: 32.3 PG
MCHC RBC AUTO-ENTMCNC: 32.2 G/DL
MCV RBC AUTO: 100.5 FL
MONOCYTES ABSOLUTE: 1.47 /ΜL
MONOCYTES RELATIVE PERCENT: 12.6 %
NEUTROPHILS ABSOLUTE: 8.83 /ΜL
NEUTROPHILS RELATIVE PERCENT: 75.3 %
PLATELET # BLD: 254 K/ΜL
PMV BLD AUTO: NORMAL FL
POTASSIUM SERPL-SCNC: 5 MMOL/L
RBC # BLD: 3.96 10^6/ΜL
SODIUM BLD-SCNC: 147 MMOL/L
TOTAL PROTEIN: 7
WBC # BLD: 11.7 10^3/ML

## 2018-12-21 DIAGNOSIS — I25.10 CORONARY ARTERY DISEASE INVOLVING NATIVE CORONARY ARTERY OF NATIVE HEART WITHOUT ANGINA PECTORIS: ICD-10-CM

## 2018-12-21 RX ORDER — POTASSIUM CHLORIDE 20 MEQ/1
TABLET, EXTENDED RELEASE ORAL
Qty: 90 TABLET | Refills: 3 | Status: SHIPPED | OUTPATIENT
Start: 2018-12-21

## 2019-02-05 ENCOUNTER — CARE COORDINATION (OUTPATIENT)
Dept: CARE COORDINATION | Age: 79
End: 2019-02-05

## 2019-02-05 ASSESSMENT — ENCOUNTER SYMPTOMS: DYSPNEA ASSOCIATED WITH: MINIMAL EXERTION

## 2019-02-08 ENCOUNTER — OFFICE VISIT (OUTPATIENT)
Dept: FAMILY MEDICINE CLINIC | Age: 79
End: 2019-02-08
Payer: MEDICARE

## 2019-02-08 VITALS
SYSTOLIC BLOOD PRESSURE: 188 MMHG | HEART RATE: 64 BPM | WEIGHT: 79 LBS | HEIGHT: 61 IN | BODY MASS INDEX: 14.91 KG/M2 | DIASTOLIC BLOOD PRESSURE: 76 MMHG | OXYGEN SATURATION: 96 %

## 2019-02-08 DIAGNOSIS — Z95.5 S/P PRIMARY ANGIOPLASTY WITH CORONARY STENT: ICD-10-CM

## 2019-02-08 DIAGNOSIS — I70.1 RAS (RENAL ARTERY STENOSIS) (HCC): ICD-10-CM

## 2019-02-08 DIAGNOSIS — I70.0 OCCLUDED AORTA (HCC): ICD-10-CM

## 2019-02-08 DIAGNOSIS — N18.30 CKD (CHRONIC KIDNEY DISEASE) STAGE 3, GFR 30-59 ML/MIN (HCC): ICD-10-CM

## 2019-02-08 DIAGNOSIS — R63.4 WEIGHT LOSS: ICD-10-CM

## 2019-02-08 DIAGNOSIS — I25.10 CORONARY ARTERY DISEASE INVOLVING NATIVE CORONARY ARTERY OF NATIVE HEART WITHOUT ANGINA PECTORIS: ICD-10-CM

## 2019-02-08 DIAGNOSIS — J96.11 CHRONIC RESPIRATORY FAILURE WITH HYPOXIA (HCC): ICD-10-CM

## 2019-02-08 DIAGNOSIS — I10 ESSENTIAL HYPERTENSION, BENIGN: ICD-10-CM

## 2019-02-08 DIAGNOSIS — J43.2 CENTRILOBULAR EMPHYSEMA (HCC): ICD-10-CM

## 2019-02-08 DIAGNOSIS — I73.9 CLAUDICATION IN PERIPHERAL VASCULAR DISEASE (HCC): ICD-10-CM

## 2019-02-08 DIAGNOSIS — C34.12 MALIGNANT NEOPLASM OF UPPER LOBE OF LEFT LUNG (HCC): Primary | ICD-10-CM

## 2019-02-08 PROBLEM — R09.02 HYPOXIA: Status: RESOLVED | Noted: 2017-07-18 | Resolved: 2019-02-08

## 2019-02-08 PROBLEM — R07.89 OTHER CHEST PAIN: Status: RESOLVED | Noted: 2018-10-26 | Resolved: 2019-02-08

## 2019-02-08 PROCEDURE — 4040F PNEUMOC VAC/ADMIN/RCVD: CPT | Performed by: FAMILY MEDICINE

## 2019-02-08 PROCEDURE — G8598 ASA/ANTIPLAT THER USED: HCPCS | Performed by: FAMILY MEDICINE

## 2019-02-08 PROCEDURE — 1123F ACP DISCUSS/DSCN MKR DOCD: CPT | Performed by: FAMILY MEDICINE

## 2019-02-08 PROCEDURE — G8419 CALC BMI OUT NRM PARAM NOF/U: HCPCS | Performed by: FAMILY MEDICINE

## 2019-02-08 PROCEDURE — G8482 FLU IMMUNIZE ORDER/ADMIN: HCPCS | Performed by: FAMILY MEDICINE

## 2019-02-08 PROCEDURE — 99215 OFFICE O/P EST HI 40 MIN: CPT | Performed by: FAMILY MEDICINE

## 2019-02-08 PROCEDURE — G8400 PT W/DXA NO RESULTS DOC: HCPCS | Performed by: FAMILY MEDICINE

## 2019-02-08 PROCEDURE — 1090F PRES/ABSN URINE INCON ASSESS: CPT | Performed by: FAMILY MEDICINE

## 2019-02-08 PROCEDURE — 1036F TOBACCO NON-USER: CPT | Performed by: FAMILY MEDICINE

## 2019-02-08 PROCEDURE — 3023F SPIROM DOC REV: CPT | Performed by: FAMILY MEDICINE

## 2019-02-08 PROCEDURE — G8926 SPIRO NO PERF OR DOC: HCPCS | Performed by: FAMILY MEDICINE

## 2019-02-08 PROCEDURE — G8427 DOCREV CUR MEDS BY ELIG CLIN: HCPCS | Performed by: FAMILY MEDICINE

## 2019-02-08 PROCEDURE — 1101F PT FALLS ASSESS-DOCD LE1/YR: CPT | Performed by: FAMILY MEDICINE

## 2019-02-08 RX ORDER — AMLODIPINE BESYLATE 10 MG/1
10 TABLET ORAL DAILY
Qty: 90 TABLET | Refills: 0 | Status: SHIPPED | OUTPATIENT
Start: 2019-02-08 | End: 2019-05-13 | Stop reason: SDUPTHER

## 2019-02-08 RX ORDER — MIRTAZAPINE 15 MG/1
15 TABLET, FILM COATED ORAL NIGHTLY
Qty: 90 TABLET | Refills: 1 | Status: SHIPPED | OUTPATIENT
Start: 2019-02-08 | End: 2019-02-18 | Stop reason: SINTOL

## 2019-02-08 ASSESSMENT — PATIENT HEALTH QUESTIONNAIRE - PHQ9
2. FEELING DOWN, DEPRESSED OR HOPELESS: 0
SUM OF ALL RESPONSES TO PHQ QUESTIONS 1-9: 0
1. LITTLE INTEREST OR PLEASURE IN DOING THINGS: 0
SUM OF ALL RESPONSES TO PHQ9 QUESTIONS 1 & 2: 0
SUM OF ALL RESPONSES TO PHQ QUESTIONS 1-9: 0

## 2019-02-15 ENCOUNTER — APPOINTMENT (OUTPATIENT)
Dept: CT IMAGING | Age: 79
End: 2019-02-15
Payer: MEDICARE

## 2019-02-15 ENCOUNTER — APPOINTMENT (OUTPATIENT)
Dept: GENERAL RADIOLOGY | Age: 79
End: 2019-02-15
Payer: MEDICARE

## 2019-02-15 ENCOUNTER — HOSPITAL ENCOUNTER (EMERGENCY)
Age: 79
Discharge: HOME | End: 2019-02-15
Attending: EMERGENCY MEDICINE
Payer: MEDICARE

## 2019-02-15 ENCOUNTER — OFFICE VISIT (OUTPATIENT)
Dept: FAMILY MEDICINE CLINIC | Age: 79
End: 2019-02-15
Payer: MEDICARE

## 2019-02-15 VITALS
RESPIRATION RATE: 22 BRPM | HEART RATE: 65 BPM | OXYGEN SATURATION: 95 % | SYSTOLIC BLOOD PRESSURE: 141 MMHG | TEMPERATURE: 97 F | DIASTOLIC BLOOD PRESSURE: 66 MMHG

## 2019-02-15 VITALS
HEIGHT: 61 IN | WEIGHT: 81 LBS | OXYGEN SATURATION: 90 % | BODY MASS INDEX: 15.29 KG/M2 | SYSTOLIC BLOOD PRESSURE: 156 MMHG | HEART RATE: 57 BPM | DIASTOLIC BLOOD PRESSURE: 80 MMHG

## 2019-02-15 DIAGNOSIS — S29.9XXA INJURY OF BACK OF THORAX, INITIAL ENCOUNTER: ICD-10-CM

## 2019-02-15 DIAGNOSIS — J96.21 ACUTE ON CHRONIC RESPIRATORY FAILURE WITH HYPOXIA (HCC): Primary | ICD-10-CM

## 2019-02-15 DIAGNOSIS — S49.91XA ARM INJURY, RIGHT, INITIAL ENCOUNTER: ICD-10-CM

## 2019-02-15 DIAGNOSIS — S22.49XA CLOSED FRACTURE OF MULTIPLE RIBS, UNSPECIFIED LATERALITY, INITIAL ENCOUNTER: Primary | ICD-10-CM
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
## 2019-02-15 PROCEDURE — G8400 PT W/DXA NO RESULTS DOC: HCPCS | Performed by: FAMILY MEDICINE

## 2019-02-15 PROCEDURE — 99215 OFFICE O/P EST HI 40 MIN: CPT | Performed by: FAMILY MEDICINE

## 2019-02-15 PROCEDURE — 1123F ACP DISCUSS/DSCN MKR DOCD: CPT | Performed by: FAMILY MEDICINE

## 2019-02-15 PROCEDURE — 99284 EMERGENCY DEPT VISIT MOD MDM: CPT

## 2019-02-15 PROCEDURE — 94664 DEMO&/EVAL PT USE INHALER: CPT

## 2019-02-15 PROCEDURE — 73030 X-RAY EXAM OF SHOULDER: CPT

## 2019-02-15 PROCEDURE — 4040F PNEUMOC VAC/ADMIN/RCVD: CPT | Performed by: FAMILY MEDICINE

## 2019-02-15 PROCEDURE — G8598 ASA/ANTIPLAT THER USED: HCPCS | Performed by: FAMILY MEDICINE

## 2019-02-15 PROCEDURE — G8482 FLU IMMUNIZE ORDER/ADMIN: HCPCS | Performed by: FAMILY MEDICINE

## 2019-02-15 PROCEDURE — 1101F PT FALLS ASSESS-DOCD LE1/YR: CPT | Performed by: FAMILY MEDICINE

## 2019-02-15 PROCEDURE — 71250 CT THORAX DX C-: CPT

## 2019-02-15 PROCEDURE — 1090F PRES/ABSN URINE INCON ASSESS: CPT | Performed by: FAMILY MEDICINE

## 2019-02-15 PROCEDURE — 71101 X-RAY EXAM UNILAT RIBS/CHEST: CPT

## 2019-02-15 PROCEDURE — G8419 CALC BMI OUT NRM PARAM NOF/U: HCPCS | Performed by: FAMILY MEDICINE

## 2019-02-15 PROCEDURE — 93005 ELECTROCARDIOGRAM TRACING: CPT | Performed by: EMERGENCY MEDICINE

## 2019-02-15 PROCEDURE — 1036F TOBACCO NON-USER: CPT | Performed by: FAMILY MEDICINE

## 2019-02-15 PROCEDURE — G8427 DOCREV CUR MEDS BY ELIG CLIN: HCPCS | Performed by: FAMILY MEDICINE

## 2019-02-15 RX ORDER — HYDROCODONE BITARTRATE AND ACETAMINOPHEN 5; 325 MG/1; MG/1
1 TABLET ORAL EVERY 6 HOURS PRN
Qty: 10 TABLET | Refills: 0 | Status: SHIPPED | OUTPATIENT
Start: 2019-02-15 | End: 2019-02-18

## 2019-02-15 ASSESSMENT — ENCOUNTER SYMPTOMS
GASTROINTESTINAL NEGATIVE: 1
SHORTNESS OF BREATH: 0
RESPIRATORY NEGATIVE: 1
EYES NEGATIVE: 1
CHEST TIGHTNESS: 0

## 2019-02-15 ASSESSMENT — PAIN SCALES - GENERAL
PAINLEVEL_OUTOF10: 2
PAINLEVEL_OUTOF10: 5

## 2019-02-17 LAB
EKG ATRIAL RATE: 62 BPM
EKG DIAGNOSIS: NORMAL
EKG P AXIS: 84 DEGREES
EKG P-R INTERVAL: 82 MS
EKG Q-T INTERVAL: 518 MS
EKG QRS DURATION: 134 MS
EKG QTC CALCULATION (BAZETT): 525 MS
EKG R AXIS: -65 DEGREES
EKG T AXIS: 252 DEGREES
EKG VENTRICULAR RATE: 62 BPM

## 2019-02-17 PROCEDURE — 93010 ELECTROCARDIOGRAM REPORT: CPT | Performed by: INTERNAL MEDICINE

## 2019-02-18 ENCOUNTER — CARE COORDINATION (OUTPATIENT)
Dept: CARE COORDINATION | Age: 79
End: 2019-02-18

## 2019-02-18 ASSESSMENT — ENCOUNTER SYMPTOMS: DYSPNEA ASSOCIATED WITH: MINIMAL EXERTION

## 2019-02-22 ENCOUNTER — OFFICE VISIT (OUTPATIENT)
Dept: FAMILY MEDICINE CLINIC | Age: 79
End: 2019-02-22
Payer: MEDICARE

## 2019-02-22 VITALS
WEIGHT: 78 LBS | SYSTOLIC BLOOD PRESSURE: 148 MMHG | HEIGHT: 61 IN | HEART RATE: 56 BPM | BODY MASS INDEX: 14.73 KG/M2 | DIASTOLIC BLOOD PRESSURE: 60 MMHG

## 2019-02-22 DIAGNOSIS — S22.41XD CLOSED FRACTURE OF MULTIPLE RIBS OF RIGHT SIDE WITH ROUTINE HEALING, SUBSEQUENT ENCOUNTER: ICD-10-CM

## 2019-02-22 DIAGNOSIS — J96.11 CHRONIC RESPIRATORY FAILURE WITH HYPOXIA (HCC): ICD-10-CM

## 2019-02-22 DIAGNOSIS — I10 ESSENTIAL HYPERTENSION, BENIGN: Primary | ICD-10-CM

## 2019-02-22 PROCEDURE — 1101F PT FALLS ASSESS-DOCD LE1/YR: CPT | Performed by: FAMILY MEDICINE

## 2019-02-22 PROCEDURE — 1036F TOBACCO NON-USER: CPT | Performed by: FAMILY MEDICINE

## 2019-02-22 PROCEDURE — G8427 DOCREV CUR MEDS BY ELIG CLIN: HCPCS | Performed by: FAMILY MEDICINE

## 2019-02-22 PROCEDURE — 99214 OFFICE O/P EST MOD 30 MIN: CPT | Performed by: FAMILY MEDICINE

## 2019-02-22 PROCEDURE — G8482 FLU IMMUNIZE ORDER/ADMIN: HCPCS | Performed by: FAMILY MEDICINE

## 2019-02-22 PROCEDURE — G8419 CALC BMI OUT NRM PARAM NOF/U: HCPCS | Performed by: FAMILY MEDICINE

## 2019-02-22 PROCEDURE — G8400 PT W/DXA NO RESULTS DOC: HCPCS | Performed by: FAMILY MEDICINE

## 2019-02-22 PROCEDURE — 4040F PNEUMOC VAC/ADMIN/RCVD: CPT | Performed by: FAMILY MEDICINE

## 2019-02-22 PROCEDURE — 1090F PRES/ABSN URINE INCON ASSESS: CPT | Performed by: FAMILY MEDICINE

## 2019-02-22 PROCEDURE — 1123F ACP DISCUSS/DSCN MKR DOCD: CPT | Performed by: FAMILY MEDICINE

## 2019-02-22 PROCEDURE — G8598 ASA/ANTIPLAT THER USED: HCPCS | Performed by: FAMILY MEDICINE

## 2019-03-04 ENCOUNTER — CARE COORDINATION (OUTPATIENT)
Dept: CARE COORDINATION | Age: 79
End: 2019-03-04

## 2019-03-04 ASSESSMENT — ENCOUNTER SYMPTOMS: DYSPNEA ASSOCIATED WITH: MINIMAL EXERTION

## 2019-04-19 ENCOUNTER — HOSPITAL ENCOUNTER (OUTPATIENT)
Dept: CT IMAGING | Age: 79
Discharge: HOME | End: 2019-04-19
Payer: MEDICARE

## 2019-04-19 DIAGNOSIS — C34.12 MALIGNANT NEOPLASM OF UPPER LOBE OF LEFT LUNG (HCC): ICD-10-CM

## 2019-04-19 PROCEDURE — 82565 ASSAY OF CREATININE: CPT

## 2019-04-19 PROCEDURE — 6360000004 HC RX CONTRAST MEDICATION: Performed by: FAMILY MEDICINE

## 2019-04-19 PROCEDURE — 71260 CT THORAX DX C+: CPT

## 2019-04-19 RX ADMIN — IOPAMIDOL 75 ML: 755 INJECTION, SOLUTION INTRAVENOUS at 12:59

## 2019-04-25 ENCOUNTER — CARE COORDINATION (OUTPATIENT)
Dept: CARE COORDINATION | Age: 79
End: 2019-04-25

## 2019-04-25 ASSESSMENT — ENCOUNTER SYMPTOMS: DYSPNEA ASSOCIATED WITH: MINIMAL EXERTION

## 2019-04-25 NOTE — CARE COORDINATION
Ambulatory Care Coordination Note  4/25/2019  CM Risk Score: 7  Kristina Mortality Risk Score: 26    ACC: Landon Alvarado, RN    Summary Note:RNCC spoke with Bereket Chavis today. She reports that she is doing well. She has followed up with Dr. Araceli Gomes and has had her CT scan of lung on 4/19/2019:    No significant interval change in distribution of pulmonary nodules as   compared to prior examination, largest within the left upper lobe.       No significant interval change in small right pleural effusion.       Progressive height loss and sclerosis of T4 vertebral body as compared to   prior.  Correlate with symptomatology.           Dr. Araceli Gomes did order a dexa scan which is scheduled. Bereket Chavis continues to monitor her symptoms. Her pulse ox was 95% on 2 L O2 at rest.  She denies cough and increased SOB and swelling. Rosalva monitors her weight daily. She has lost a few pounds and her weight today was 78 lbs. She has tried to supplement her diet with Ensure but does not like it. She is getting MOW's. Action:  Praised monitoring. Encouraged to use Ensure Enlive due to increased protein and calories. Suggested high protein snacks and frequent meals. Reviewed zones. PLAN:  Possible discharge next visit  F/u on Dexa  Meet at next appt. Future Appointments   Date Time Provider Rakesh Jacobson   5/10/2019  1:45 PM [facility] 1 [facility]   5/22/2019 11:00 AM Karma Moses MD [facility]     Care Coordination Interventions    Program Enrollment:  Complex Care  Referral from Primary Care Provider:  No  Suggested Interventions and Community Resources  Fall Risk Prevention:  Completed  Home Health Services:  Completed  Meals on Wheels:  Declined  Registered Dietician:  Declined  Senior Services:  Completed (Comment: COA referral- [facility])  Zone Management Tools:   In Process  Other Services or Interventions:   [facility] follow up         Goals Addressed                 This Visit's Progress       Patient Stated     COMPLETED: Patient Stated (pt-stated)        I will use my incentive spirometer  8 times every hour while awake    Barriers: lack of motivation  Plan for overcoming my barriers: education on benefits of deep breathing with fractured ribs. Confidence: 8/10  Anticipated Goal Completion Date:3/18/2019         Other     Self Monitoring   On track     Daily Weights - I will notify my provider of any increase in weight by 3 or more pounds in 2 days OR 5 or more pounds in a week. Patient Reported Weight No flowsheet data found. 82 lbs    Barriers: none  Plan for overcoming my barriers: N/A  Confidence: 9/10  Anticipated Goal Completion Date: permanent              Prior to Admission medications    Medication Sig Start Date End Date Taking? Authorizing Provider   amLODIPine (NORVASC) 10 MG tablet Take 1 tablet by mouth daily 2/8/19   Javier Fowler MD   potassium chloride (KLOR-CON M) 20 MEQ extended release tablet TAKE ONE TABLET BY MOUTH EVERY OTHER DAY WITH LASIX (GENERIC NAME IS FUROSEMIDE) 12/21/18   Elyssa Schultz MD   furosemide (LASIX) 20 MG tablet TAKE ONE TABLET BY MOUTH EVERY OTHER DAY. 11/12/18   Marnie Gamez MD   ADVAIR -02 MCG/ACT inhaler INHALE TWO PUFFS INTO THE LUNGS TWO TIMES A DAY. 11/12/18   Marnie Gamez MD   traMADol (ULTRAM) 50 MG tablet Take 50 mg by mouth every 6 hours as needed for Pain. Fabiana Mcdowell Historical Provider, MD   tiotropium (SPIRIVA RESPIMAT) 2.5 MCG/ACT AERS inhaler Inhale 2 puffs into the lungs daily 10/26/18   Emma Olson MD   atorvastatin (LIPITOR) 80 MG tablet TAKE ONE TABLET BY MOUTH NIGHTLY 9/10/18   Marnie Gamez MD   omeprazole (PRILOSEC) 20 MG delayed release capsule TAKE 1 CAPSULE BY MOUTH DAILY 9/10/18   Marnie Gamez MD   clopidogrel (PLAVIX) 75 MG tablet TAKE ONE TABLET BY MOUTH DAILY.  9/10/18   Marnie Gamez MD   carvedilol (COREG) 12.5 MG tablet TAKE 1 TABLET BY MOUTH 2 TIMES DAILY (WITH MEALS) 8/16/18   aGrrett Chilel MD   SENEXON-S 8.6-50 MG per tablet TAKE ONE TABLET BY MOUTH TWO TIMES A DAY 8/16/18   Leeann Brothers MD   Compression Stockings MISC by Does not apply route Bilateral knee high compression stockings, 15-20 mmHg daily for leg swelling 8/17/17   Willie Newberry MD   Handicap Placard MISC by Does not apply route Duration 5 years 10/6/16   Leeann Brothers MD   albuterol sulfate HFA (PROAIR HFA) 108 (90 BASE) MCG/ACT inhaler Inhale 2 puffs into the lungs every 6 hours as needed for Wheezing 9/14/16   Leeann Brothers MD   Elastic Bandages & Supports (B & B HERNIA BELT) MISC Use as needed for comfort 5/7/15   Leeann Brothers MD   aspirin 81 MG EC tablet Take 1 tablet by mouth nightly. 3/18/15   Kenya Valdez MD   Multiple Vitamins-Minerals (CENTRUM SILVER PO) Take 1 tablet by mouth daily.     Historical Provider, MD       Future Appointments   Date Time Provider Rakesh Jacobson   5/10/2019  1:45 PM [facility] D[facility]A RM 1 [facility]   5/22/2019 11:00 AM Karma Levy MD [facility]     ,   Diabetes Assessment           ,   Congestive Heart Failure Assessment    Are you currently restricting fluids?:  No Restriction  Do you understand a low sodium diet?:  Yes  Do you understand how to read food labels?:  No  How many restaurant meals do you eat per week?:  1-2  Do you salt your food before tasting it?:  Yes     No patient-reported symptoms      Symptoms:   CHF associated angina: Neg, CHF associated dyspnea on exertion: Pos, CHF associated fatigue: Neg, CHF associated leg swelling: Neg      Symptom course:  stable  Weight trend:  decreasing steadily  Salt intake watch compared to last visit:  stable      and   COPD Assessment    Does the patient understand envrionmental exposure?:  Yes  Is the patient able to verbalize Rescue vs. Long Acting medications?:  Yes  Does the patient have a nebulizer?:  No  Does the patient use a space with inhaled medications?:  Yes     No patient-reported symptoms         Symptoms:      Breathlessness:  minimal exertion  Change in chronic cough?:  No/At Baseline  Change in sputum?:  No/At Baseline  Self Monitoring - SaO2:  Yes  Baseline SaO2 Readin (Comment: on 2 L O2 at rest)

## 2019-04-25 NOTE — TELEPHONE ENCOUNTER
I agree with the [place], but would question if you can stay on with her care long term due to the complexity.     This is a patient that is likely to decompensate over the next year or 2

## 2019-05-10 ENCOUNTER — HOSPITAL ENCOUNTER (OUTPATIENT)
Dept: WOMENS IMAGING | Age: 79
Discharge: HOME | End: 2019-05-10
Payer: MEDICARE

## 2019-05-10 DIAGNOSIS — C34.12 SQUAMOUS CELL CARCINOMA OF BRONCHUS IN LEFT UPPER LOBE (HCC): ICD-10-CM

## 2019-05-10 DIAGNOSIS — S22.000A CLOSED COMPRESSION FRACTURE OF THORACIC VERTEBRA, INITIAL ENCOUNTER (HCC): ICD-10-CM

## 2019-05-10 PROCEDURE — 77080 DXA BONE DENSITY AXIAL: CPT

## 2019-05-13 RX ORDER — AMLODIPINE BESYLATE 10 MG/1
TABLET ORAL
Qty: 90 TABLET | Refills: 1 | Status: SHIPPED | OUTPATIENT
Start: 2019-05-13 | End: 2019-09-12 | Stop reason: SDUPTHER

## 2019-05-22 ENCOUNTER — CARE COORDINATION (OUTPATIENT)
Dept: CARE COORDINATION | Age: 79
End: 2019-05-22

## 2019-05-22 ENCOUNTER — OFFICE VISIT (OUTPATIENT)
Dept: FAMILY MEDICINE CLINIC | Age: 79
End: 2019-05-22
Payer: MEDICARE

## 2019-05-22 VITALS
HEART RATE: 61 BPM | BODY MASS INDEX: 14.35 KG/M2 | DIASTOLIC BLOOD PRESSURE: 65 MMHG | HEIGHT: 61 IN | SYSTOLIC BLOOD PRESSURE: 133 MMHG | WEIGHT: 76 LBS

## 2019-05-22 DIAGNOSIS — I25.10 CORONARY ARTERY DISEASE INVOLVING NATIVE CORONARY ARTERY OF NATIVE HEART WITHOUT ANGINA PECTORIS: ICD-10-CM

## 2019-05-22 DIAGNOSIS — I25.5 ISCHEMIC CARDIOMYOPATHY: ICD-10-CM

## 2019-05-22 DIAGNOSIS — E44.0 MODERATE PROTEIN-CALORIE MALNUTRITION (HCC): ICD-10-CM

## 2019-05-22 DIAGNOSIS — E55.9 VITAMIN D DEFICIENCY: ICD-10-CM

## 2019-05-22 DIAGNOSIS — J96.11 CHRONIC RESPIRATORY FAILURE WITH HYPOXIA (HCC): ICD-10-CM

## 2019-05-22 DIAGNOSIS — M81.0 AGE-RELATED OSTEOPOROSIS WITHOUT CURRENT PATHOLOGICAL FRACTURE: ICD-10-CM

## 2019-05-22 DIAGNOSIS — R63.4 WEIGHT LOSS: ICD-10-CM

## 2019-05-22 DIAGNOSIS — R59.0 MEDIASTINAL ADENOPATHY: ICD-10-CM

## 2019-05-22 DIAGNOSIS — C34.12 MALIGNANT NEOPLASM OF UPPER LOBE OF LEFT LUNG (HCC): Primary | ICD-10-CM

## 2019-05-22 DIAGNOSIS — I73.9 PAD (PERIPHERAL ARTERY DISEASE) (HCC): ICD-10-CM

## 2019-05-22 DIAGNOSIS — I70.0 OCCLUDED AORTA (HCC): ICD-10-CM

## 2019-05-22 DIAGNOSIS — N18.30 CKD (CHRONIC KIDNEY DISEASE) STAGE 3, GFR 30-59 ML/MIN (HCC): ICD-10-CM

## 2019-05-22 DIAGNOSIS — I10 ESSENTIAL HYPERTENSION, BENIGN: ICD-10-CM

## 2019-05-22 DIAGNOSIS — I70.1 RAS (RENAL ARTERY STENOSIS) (HCC): ICD-10-CM

## 2019-05-22 DIAGNOSIS — J43.2 CENTRILOBULAR EMPHYSEMA (HCC): ICD-10-CM

## 2019-05-22 LAB
ANION GAP SERPL CALCULATED.3IONS-SCNC: 14 MMOL/L (ref 3–16)
BUN BLDV-MCNC: 25 MG/DL (ref 7–20)
CALCIUM SERPL-MCNC: 9.4 MG/DL (ref 8.3–10.6)
CHLORIDE BLD-SCNC: 104 MMOL/L (ref 99–110)
CO2: 24 MMOL/L (ref 21–32)
CREAT SERPL-MCNC: 0.8 MG/DL (ref 0.6–1.2)
GFR AFRICAN AMERICAN: >60
GFR NON-AFRICAN AMERICAN: >60
GLUCOSE BLD-MCNC: 99 MG/DL (ref 70–99)
POTASSIUM SERPL-SCNC: 4.5 MMOL/L (ref 3.5–5.1)
SODIUM BLD-SCNC: 142 MMOL/L (ref 136–145)
VITAMIN D 25-HYDROXY: 62.5 NG/ML

## 2019-05-22 PROCEDURE — 1036F TOBACCO NON-USER: CPT | Performed by: FAMILY MEDICINE

## 2019-05-22 PROCEDURE — G8419 CALC BMI OUT NRM PARAM NOF/U: HCPCS | Performed by: FAMILY MEDICINE

## 2019-05-22 PROCEDURE — 36415 COLL VENOUS BLD VENIPUNCTURE: CPT | Performed by: FAMILY MEDICINE

## 2019-05-22 PROCEDURE — G8598 ASA/ANTIPLAT THER USED: HCPCS | Performed by: FAMILY MEDICINE

## 2019-05-22 PROCEDURE — 1090F PRES/ABSN URINE INCON ASSESS: CPT | Performed by: FAMILY MEDICINE

## 2019-05-22 PROCEDURE — 99215 OFFICE O/P EST HI 40 MIN: CPT | Performed by: FAMILY MEDICINE

## 2019-05-22 PROCEDURE — G8399 PT W/DXA RESULTS DOCUMENT: HCPCS | Performed by: FAMILY MEDICINE

## 2019-05-22 PROCEDURE — G8427 DOCREV CUR MEDS BY ELIG CLIN: HCPCS | Performed by: FAMILY MEDICINE

## 2019-05-22 PROCEDURE — G8926 SPIRO NO PERF OR DOC: HCPCS | Performed by: FAMILY MEDICINE

## 2019-05-22 PROCEDURE — 3023F SPIROM DOC REV: CPT | Performed by: FAMILY MEDICINE

## 2019-05-22 PROCEDURE — 1123F ACP DISCUSS/DSCN MKR DOCD: CPT | Performed by: FAMILY MEDICINE

## 2019-05-22 PROCEDURE — 4040F PNEUMOC VAC/ADMIN/RCVD: CPT | Performed by: FAMILY MEDICINE

## 2019-05-22 RX ORDER — PEDIATRIC NUTRIT, IRON/DHA/ARA 4G/150KCAL
POWDER (GRAM) ORAL
Qty: 60 CAN | Refills: 5 | Status: SHIPPED | OUTPATIENT
Start: 2019-05-22

## 2019-05-22 RX ORDER — DRONABINOL 2.5 MG/1
2.5 CAPSULE ORAL
Qty: 60 CAPSULE | Refills: 0 | Status: CANCELLED | OUTPATIENT
Start: 2019-05-22 | End: 2019-06-21

## 2019-05-22 RX ORDER — ALENDRONATE SODIUM 70 MG/1
70 TABLET ORAL
Qty: 12 TABLET | Refills: 3 | Status: ON HOLD | OUTPATIENT
Start: 2019-05-22 | End: 2019-07-21

## 2019-05-22 ASSESSMENT — PATIENT HEALTH QUESTIONNAIRE - PHQ9
SUM OF ALL RESPONSES TO PHQ QUESTIONS 1-9: 0
SUM OF ALL RESPONSES TO PHQ QUESTIONS 1-9: 0

## 2019-05-22 ASSESSMENT — ENCOUNTER SYMPTOMS: DYSPNEA ASSOCIATED WITH: MINIMAL EXERTION

## 2019-05-22 NOTE — PATIENT INSTRUCTIONS
Instructions on Fosamax use : Take medication on an empty stomach first thing in the morning. Drink with an entire 8 ounce glass of water.   Do not recline or lay down until you have done both of the followin. ) Wait entire hour and  2.)  Eat breakfast.        [address] [facility], [address] [facility], MD  [address] [place] [address] Drive. , [address] [facility], [place] [address]  [phone]    [facility], [address] [facility] Mj Garrido MD  [facility], [place] [address]  Phone: [phone]

## 2019-05-22 NOTE — PROGRESS NOTES
PROGRESS NOTE     Cody Peres MD  [address] [facility] [address]  [place] [address]  [phone] office  [phone] fax    Date of Service:  5/22/2019    Subjective:      Patient ID: Hyacinth Lopez is a 70 y.o. female      CC: lung cancer, emphysema with chronic resp failure, PAD secondary to occluded distal aorta with symptomatic claudication, HTN, CKD with renal artery stenosis, weight loss, CAD s/p stent 2834, diastolic HF, osteoporosis    A highly complex, at risk patient with multiple co-morbidities here for f/u with multiple issues. HPI   Malignant neoplasm of upper lobe of left lung [facility])  Patient diagnosis stage I squamous cell lung cancer, status post VATS with wedge resection in September 2014, with newly formed hypermetabolic nodule the following year. As she was not a candidate for chemo or surgery giving her comorbidities, she received radiation treatment. She is followed by Dr. Junior Martinez. She last saw his NP on 4/24/2019. According to this note, her recent CT chest showed no interval change. A repeat CT scan was ordered for 5 months. CT chest 4/19/19:  Impression   No significant interval change in distribution of pulmonary nodules as   compared to prior examination, largest within the left upper lobe.       No significant interval change in small right pleural effusion.       Progressive height loss and sclerosis of T4 vertebral body as compared to   prior.  Correlate with symptomatology.             Osteoporosis/osteopenia:  Current pharmacologic therapy and date started none. Medication side effects: not applicable. Last DXA:  5/10/19-  T score at spine -4.0 and lowest hip -5.1. Current calcium intake is at least 1200 mg/day from diet and supplements: yes. She is currently taking 0 IU/day of supplemental vitamin D.   Regular weight-bearing exercise: no.    No results found for: VITD25        Chronic respiratory failure with hypoxia (HCC)/ Peanut butter and jelly and coffee  Dinner:  Meals on wheels and coffee    She never snacks. She states she does have dyspnea on exertion obviously due to her lung disease, but denies fatigue, night sweats     CKD (chronic kidney disease) stage 3, GFR 30-59 ml/min ([facility])/  Renal artery stenosis  Patient denies any trouble urinating. She is aware that she has stage III kidney disease, and states she's had this chronically. Diastolic HF, Coronary artery disease involving native coronary artery of native heart without angina pectoris/ S/P primary angioplasty with coronary stent  Patient denies any current chest pain, palpitations, episodes of diaphoresis, lightheadedness, orthopnea, paroxysmal nocturnal dyspnea. She states she does have some intermittent very mild lower extremity edema. She last saw Dr. Rylie Romero on 7/30/18. He advised her to eat a low-sodium diet, walk 10 minutes 3 times a day. He noted she declined impression stockings. She is compliant with her aspirin, Plavix, statin, and beta blocker. She does not do daily weights, but does take her Lasix daily and does watch her salt. Cath:3/16/2015  ANGIOGRAPHIC FINDINGS: Right coronary comes from right coronary cusp. It is a dominant vessel giving rise to posterior descending artery, posterolateral branch, and distal portion with 90% stenosis and mid portion at least 80% to 85% stenosis present. INTERVENTION PERFORMED: We intervened on right coronary artery and placed a 2.5 x 18 mm drug-eluting Xience Alpine stent. In the midportion we placed a 2.75 x 33 mm drug-coated Xience Alpine stent with final stent diameter of 3.7 mm. ECHO:7/31/17  Summary   Left ventricle size is normal. Normal left ventricular wall thickness.   Ejection fraction is visually estimated to be 60-65 %. No regional wall   motion abnormalities are noted.  Diastolic filling parameters suggests grade   I diastolic dysfunction .   The mitral valve leaflets are mildly thickened with normal leaflet mobility.   Trivial mitral regurgitation is present.   The left atrium is mildly dilated.   The aortic valve is mildly thickened, opens well with normal gradients.   There is mild tricuspid regurgitation with RVSP estimated at 58 mmHg. which   is mild to moderately elevated. Estimated right atrial pressure is 8 mmHg.     Vitals:    05/22/19 1054   BP: 133/65   Pulse: 61   Weight: 76 lb (34.5 kg)   Height: 5' 1\" (1.549 m)       Outpatient Medications Marked as Taking for the 5/22/19 encounter (Office Visit) with Margrett Olszewski, MD   Medication Sig Dispense Refill    amLODIPine (NORVASC) 10 MG tablet TAKE ONE TABLET BY MOUTH DAILY. 90 tablet 1    potassium chloride (KLOR-CON M) 20 MEQ extended release tablet TAKE ONE TABLET BY MOUTH EVERY OTHER DAY WITH LASIX (GENERIC NAME IS FUROSEMIDE) 90 tablet 3    furosemide (LASIX) 20 MG tablet TAKE ONE TABLET BY MOUTH EVERY OTHER DAY. 30 tablet 3    ADVAIR -21 MCG/ACT inhaler INHALE TWO PUFFS INTO THE LUNGS TWO TIMES A DAY. 36 g 3    traMADol (ULTRAM) 50 MG tablet Take 50 mg by mouth every 6 hours as needed for Pain. .      tiotropium (SPIRIVA RESPIMAT) 2.5 MCG/ACT AERS inhaler Inhale 2 puffs into the lungs daily 1 Inhaler 0    atorvastatin (LIPITOR) 80 MG tablet TAKE ONE TABLET BY MOUTH NIGHTLY 90 tablet 3    omeprazole (PRILOSEC) 20 MG delayed release capsule TAKE 1 CAPSULE BY MOUTH DAILY 90 capsule 3    clopidogrel (PLAVIX) 75 MG tablet TAKE ONE TABLET BY MOUTH DAILY.  90 tablet 3    carvedilol (COREG) 12.5 MG tablet TAKE 1 TABLET BY MOUTH 2 TIMES DAILY (WITH MEALS) 180 tablet 3    SENEXON-S 8.6-50 MG per tablet TAKE ONE TABLET BY MOUTH TWO TIMES A DAY 60 tablet 11    Compression Stockings MISC by Does not apply route Bilateral knee high compression stockings, 15-20 mmHg daily for leg swelling 2 each 1    Handicap Placard MISC by Does not apply route Duration 5 years 1 each 0    albuterol sulfate HFA (PROAIR HFA) 108 (90 wedge excision RLL nodule - squamous cell    THORACOTOMY Right 9/15/14    Right VATS with wedge excision right lower lung nodule:  Dr. Castro Self  age 27    UPPER GASTROINTESTINAL ENDOSCOPY  4-20-15       Social History     Tobacco Use    Smoking status: Former Smoker     Packs/day: 1.00     Years: 53.00     Pack years: 53.00     Types: Cigarettes     Last attempt to quit: 9/15/2014     Years since quittin.6    Smokeless tobacco: Never Used    Tobacco comment: abstaining.    Substance Use Topics    Alcohol use: No       Family History   Problem Relation Age of Onset    Cancer Mother         lymphoma  in 68's    Heart Disease Mother     Heart Attack Father         late onset    Diabetes Father            Review of Systems  Constitutional:  Negative for activity or appetite change, fever or fatigue  HENT:  Negative for congestion,sinus pressure, or rhinorrhea  Eyes:  Negative for eye pain or visual changes  Resp:  +SOB, +mild intermittent dry cough  Cardiovascular: Negative for CP, palpitations, LANGE, orthopnea, PND, +mild intermittent LE edema, +claudication pain with walking in legs  Gastrointestinal: Negative for abd pain, melena, BRBPR, N/V/D  :  Negative for dysuria, flank pain or urinary frequency  Musculoskeletal:  Negative for back pain or myalgias  Neuro:  Negative for dizziness or lightheadedness  Psych: negative for depression or anxiety      Objective:   Constitutional:   · Reviewed vitals above  · Well Nourished, well developed, no distress       HENT:  · Normal external nose without lesions  · Normal oropharynx without erythema or exudate, +dentures  · Normal nasal mucosa without swelling or erythema  Neck:  · Symmetric and without masses  · No thyromegaly  Resp:  · Diminished BS diffusely   · Clear to auscultation bilaterally without rhonchi, wheezing or crackles  Cardiovascular:  · On auscultation, EUN 2nd intercostal space, RSB that radiates up into both carotids coronary stent  Euvolemic today. No signs of angina. Continue medication management with Plavix, statin, aspirin, beta blocker, and Lasix. Patient told to follow-up with Dr. Anay RANKIN as is overdue for appointment. Osteoporosis  Discussed that this is very severe. Patient needs to do everything she can to prevent falls. She may be a candidate for Forteo given the severity, but is declining any injectable medicines. Starting Fosamax. Discussed proper way to take it to avoid side effects. Discussed side effects and expected course. Pt reported understanding. Checking vit D      HM:  Doesn't want any more colonoscopies.   Declining all vaccines      Pt told to f/u in 3 months

## 2019-05-22 NOTE — CARE COORDINATION
Line)  Zone Management Tools: In Process  Other Services or Interventions:   [facility] follow up         Goals Addressed                 This Visit's Progress     Self Monitoring   On track     Daily Weights - I will notify my provider of any increase in weight by 3 or more pounds in 2 days OR 5 or more pounds in a week. Patient Reported Weight No flowsheet data found. 82 lbs    Barriers: none  Plan for overcoming my barriers: N/A  Confidence: 9/10  Anticipated Goal Completion Date: permanent              Prior to Admission medications    Medication Sig Start Date End Date Taking? Authorizing Provider   alendronate (FOSAMAX) 70 MG tablet Take 1 tablet by mouth every 7 days 5/22/19   Taiwo Jones MD   Nutritional Supplements (ENSURE ENLIVE) LIQD One or 2 bottles daily (ok to give whichever brand is covered) 5/22/19   Taiwo Jones MD   amLODIPine (NORVASC) 10 MG tablet TAKE ONE TABLET BY MOUTH DAILY. 5/13/19   Karma Johnston MD   potassium chloride (KLOR-CON M) 20 MEQ extended release tablet TAKE ONE TABLET BY MOUTH EVERY OTHER DAY WITH LASIX (GENERIC NAME IS FUROSEMIDE) 12/21/18   Judith Max MD   furosemide (LASIX) 20 MG tablet TAKE ONE TABLET BY MOUTH EVERY OTHER DAY. 11/12/18   Senthil Villa MD   ADVAIR -89 MCG/ACT inhaler INHALE TWO PUFFS INTO THE LUNGS TWO TIMES A DAY. 11/12/18   Senthil Villa MD   traMADol (ULTRAM) 50 MG tablet Take 50 mg by mouth every 6 hours as needed for Pain. Kim Dunlap Historical Provider, MD   tiotropium (SPIRIVA RESPIMAT) 2.5 MCG/ACT AERS inhaler Inhale 2 puffs into the lungs daily 10/26/18   Pamela Moreno MD   atorvastatin (LIPITOR) 80 MG tablet TAKE ONE TABLET BY MOUTH NIGHTLY 9/10/18   Senthil Villa MD   omeprazole (PRILOSEC) 20 MG delayed release capsule TAKE 1 CAPSULE BY MOUTH DAILY 9/10/18   Senthil Villa MD   clopidogrel (PLAVIX) 75 MG tablet TAKE ONE TABLET BY MOUTH DAILY.  9/10/18   Senthil Villa MD   carvedilol (COREG) 12.5 MG tablet TAKE 1 TABLET BY MOUTH 2 TIMES DAILY (WITH MEALS) 8/16/18   Ruperto Padron MD   SENEXON-S 8.6-50 MG per tablet TAKE ONE TABLET BY MOUTH TWO TIMES A DAY 8/16/18   Sharan Barron MD   Compression Stockings MISC by Does not apply route Bilateral knee high compression stockings, 15-20 mmHg daily for leg swelling 8/17/17   Hanane Garces MD   Handicap Placard MISC by Does not apply route Duration 5 years 10/6/16   Sharan Barron MD   albuterol sulfate HFA (PROAIR HFA) 108 (90 BASE) MCG/ACT inhaler Inhale 2 puffs into the lungs every 6 hours as needed for Wheezing 9/14/16   Sharan Barron MD   Elastic Bandages & Supports (B & B HERNIA BELT) MISC Use as needed for comfort 5/7/15   Sharan Barron MD   aspirin 81 MG EC tablet Take 1 tablet by mouth nightly. 3/18/15   Juanis Carl MD   Multiple Vitamins-Minerals (CENTRUM SILVER PO) Take 1 tablet by mouth daily.     Historical Provider, MD       Future Appointments   Date Time Provider Rakesh Jacobson   8/23/2019 11:00 AM Karma Meeks MD [facility]     ,   Congestive Heart Failure Assessment    Are you currently restricting fluids?:  No Restriction  Do you understand a low sodium diet?:  Yes  Do you understand how to read food labels?:  No  How many restaurant meals do you eat per week?:  1-2  Do you salt your food before tasting it?:  Yes     No patient-reported symptoms      Symptoms:   CHF associated angina: Neg, CHF associated dyspnea on exertion: Pos, CHF associated leg swelling: Neg, CHF associated orthostatic hypotension: Neg      Symptom course:  stable  Patient-reported weight (lb):  78  Weight trend:  decreasing steadily  Salt intake watch compared to last visit:  stable      and   COPD Assessment    Does the patient understand envrionmental exposure?:  Yes  Is the patient able to verbalize Rescue vs. Long Acting medications?:  Yes  Does the patient have a nebulizer?:  No  Does the patient use a space with inhaled medications?: Yes     Shortness of breath (worse than baseline)         Symptoms:      Breathlessness:  minimal exertion  Change in chronic cough?:  No/At Baseline  Change in sputum?:  No/At Baseline  Self Monitoring - SaO2:  Yes  Baseline SaO2 Readin

## 2019-05-23 RX ORDER — NORTRIPTYLINE HYDROCHLORIDE 10 MG/1
10 CAPSULE ORAL NIGHTLY
Qty: 90 CAPSULE | Refills: 0 | Status: SHIPPED | OUTPATIENT
Start: 2019-05-23 | End: 2019-08-12 | Stop reason: SDUPTHER

## 2019-05-23 RX ORDER — NORTRIPTYLINE HYDROCHLORIDE 10 MG/1
CAPSULE ORAL
Qty: 30 CAPSULE | Refills: 11 | OUTPATIENT
Start: 2019-05-23

## 2019-05-23 NOTE — TELEPHONE ENCOUNTER
I called patient and she is indeed taking this medication even though it was not listed as a current med. Can you please change to 90 day supply and route back for me to sign?  Thanks

## 2019-05-23 NOTE — TELEPHONE ENCOUNTER
PT Pharmacy called in stating that they received a denial for this medication stating \"refill not appropriate\" the pharmacy was following up to clarify why it was denied. Please call back: [phone].

## 2019-05-31 ENCOUNTER — TELEPHONE (OUTPATIENT)
Dept: FAMILY MEDICINE CLINIC | Age: 79
End: 2019-05-31

## 2019-05-31 NOTE — TELEPHONE ENCOUNTER
dagmar De La Torre is requesting to speak with you RE: ensure. States you instructed her to call you. Please advise.  Please call Dave Beck back at [phone]

## 2019-06-05 ENCOUNTER — OFFICE VISIT (OUTPATIENT)
Dept: PULMONOLOGY | Age: 79
End: 2019-06-05
Payer: MEDICARE

## 2019-06-05 VITALS
HEIGHT: 61 IN | RESPIRATION RATE: 20 BRPM | TEMPERATURE: 97.8 F | BODY MASS INDEX: 14.69 KG/M2 | WEIGHT: 77.8 LBS | SYSTOLIC BLOOD PRESSURE: 148 MMHG | HEART RATE: 62 BPM | DIASTOLIC BLOOD PRESSURE: 78 MMHG | OXYGEN SATURATION: 96 %

## 2019-06-05 DIAGNOSIS — J43.2 CENTRILOBULAR EMPHYSEMA (HCC): Primary | ICD-10-CM

## 2019-06-05 DIAGNOSIS — J96.11 CHRONIC RESPIRATORY FAILURE WITH HYPOXIA (HCC): ICD-10-CM

## 2019-06-05 PROCEDURE — 1036F TOBACCO NON-USER: CPT | Performed by: INTERNAL MEDICINE

## 2019-06-05 PROCEDURE — G8598 ASA/ANTIPLAT THER USED: HCPCS | Performed by: INTERNAL MEDICINE

## 2019-06-05 PROCEDURE — G8926 SPIRO NO PERF OR DOC: HCPCS | Performed by: INTERNAL MEDICINE

## 2019-06-05 PROCEDURE — 4040F PNEUMOC VAC/ADMIN/RCVD: CPT | Performed by: INTERNAL MEDICINE

## 2019-06-05 PROCEDURE — G8399 PT W/DXA RESULTS DOCUMENT: HCPCS | Performed by: INTERNAL MEDICINE

## 2019-06-05 PROCEDURE — 3023F SPIROM DOC REV: CPT | Performed by: INTERNAL MEDICINE

## 2019-06-05 PROCEDURE — G8419 CALC BMI OUT NRM PARAM NOF/U: HCPCS | Performed by: INTERNAL MEDICINE

## 2019-06-05 PROCEDURE — 1123F ACP DISCUSS/DSCN MKR DOCD: CPT | Performed by: INTERNAL MEDICINE

## 2019-06-05 PROCEDURE — 1090F PRES/ABSN URINE INCON ASSESS: CPT | Performed by: INTERNAL MEDICINE

## 2019-06-05 PROCEDURE — 99213 OFFICE O/P EST LOW 20 MIN: CPT | Performed by: INTERNAL MEDICINE

## 2019-06-05 PROCEDURE — G8427 DOCREV CUR MEDS BY ELIG CLIN: HCPCS | Performed by: INTERNAL MEDICINE

## 2019-06-05 RX ORDER — PREDNISONE 10 MG/1
TABLET ORAL
Qty: 30 TABLET | Refills: 0 | Status: SHIPPED | OUTPATIENT
Start: 2019-06-05 | End: 2019-06-15

## 2019-06-05 NOTE — PROGRESS NOTES
REASON FOR CONSULTATION/CC: pulmonary nodule      CONSULTING PHYSICIAN: Lolita Foster MD   PCP: Lolita Foster MD    HISTORY OF PRESENT ILLNESS: Kaden Calderon is a 70y.o. year old female with a history of smoking who presents with pulmonary nodule. Copd  She is having increased dyspnea on exertion for the last week. She has increased 2.5 to 3L. On the last visit, she was advised to increase o2 to 4L with exertion. She was supposed to add Spiriva to Advair but this did not help so stopped Spiriva. No wheezing. occasional cough.  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 Vinh Elkins    Carotid stenosis 07/2014    bilat 50-79% 9/2014    Chronic respiratory failure with hypoxia ([address]     HOME O2    COPD (chronic obstructive pulmonary disease) ([address] 46/8860    Diastolic CHF ([address] 89/8610    7/2017 ECHO = EF 60% + GRADE I DD    HTN (hypertension)     Incarcerated right inguinal hernia 08/2017    REDUCED IN ED    Left inguinal hernia     Macular hemorrhage of left eye 02/20/2017    Dr. Sandra Nichols NSTEMI (non-ST elevated myocardial infarction) [facility])     Occluded distal aorta ([address]     Osteoporosis     PAD (peripheral artery disease) ([address]     SEVERE    Pulmonary HTN ([address] 07/2017 7/2017 ECHO = RVSP @ 58 + MILD TR    ZI (renal artery stenosis) ([address]     SBO (small bowel obstruction) ([address] 08/2017    2/2 HERNIA  and 4/21/18    Small cell lung cancer ([address] 09/15/2014    SCCA 12 mm :  Dr. Dawn Crews : [place] [facility]  /  (R) WEDGE RESECTION  /  8/2015 RECURRANCE W/ NORA STEROTATIC RADIOSURGERY    Wears glasses     reading    Weight loss     25 lbs over last 4 yrs       PAST SURGICAL HISTORY:  Past Surgical History:   Procedure Laterality Date    CATARACT REMOVAL Bilateral 2011     [facility]      Dr. Dandre Mai Right 08/2017    Dr. Ariella Rivera Left 04/21/2018    Dr. Yahaira Shore Right 9/15/14    wedge excision RLL nodule - squamous cell    THORACOTOMY Right 9/15/14    Right VATS with wedge excision right lower lung nodule:  Dr. Ana Sims EXTRACTION  age 27    UPPER GASTROINTESTINAL ENDOSCOPY  4-20-15       FAMILY HISTORY:  family history includes Cancer in her mother; Diabetes in her father; Heart Attack in her father; Heart Disease in her mother. SOCIAL HISTORY:   reports that she quit smoking about 6 years ago. Her smoking use included cigarettes. She has a 53.00 pack-year smoking history. She has never used smokeless tobacco.    ALLERGIES:  Patient is allergic to ace inhibitors. REVIEW OF SYSTEMS:  Constitutional: Negative for fever   HENT: Negative for sore throat  Respiratory: ++ for dyspnea, + cough  Cardiovascular: Negative for chest pain  Gastrointestinal: Negative for vomiting, diarrhea   Skin: Negative for rash  Psychiatric/Behavorial: Negative for anxiety     Objective:   PHYSICAL EXAM:  Blood pressure (!) 148/78, pulse 62, temperature 97.8 °F (36.6 °C), temperature source Oral, resp. rate 20, height 5' 1\" (1.549 m), weight 77 lb 12.8 oz (35.3 kg), SpO2 96 %, not currently breastfeeding.'  Gen: No distress. + thin   ENT:   Resp: No accessory muscle use. No crackles. No wheezes. No rhonchi. CV: Regular rate. Regular rhythm. No murmur or rub. No edema. Skin: Warm, dry, normal texture and turgor. No nodule on exposed extremities. M/S: No cyanosis. No clubbing. No joint deformity. Psych: Oriented x 3. No anxiety. Awake. Alert. Intact judgement and insight. Good Mood / Affect.   Memory appears in tact     Current Outpatient Medications on File Prior to Visit   Medication Sig Dispense Refill    nortriptyline (PAMELOR) 10 MG capsule Take 1 capsule by mouth nightly 90 capsule 0    Nutritional Supplements (ENSURE ENLIVE) LIQD One or 2 bottles daily (ok to give whichever brand is covered) 60 Can 5    amLODIPine (NORVASC) 10 MG tablet TAKE ONE TABLET BY MOUTH DAILY. 90 tablet 1    potassium chloride (KLOR-CON M) 20 MEQ extended release tablet TAKE ONE TABLET BY MOUTH EVERY OTHER DAY WITH LASIX (GENERIC NAME IS FUROSEMIDE) 90 tablet 3    furosemide (LASIX) 20 MG tablet TAKE ONE TABLET BY MOUTH EVERY OTHER DAY. 30 tablet 3    ADVAIR -21 MCG/ACT inhaler INHALE TWO PUFFS INTO THE LUNGS TWO TIMES A DAY. 36 g 3    traMADol (ULTRAM) 50 MG tablet Take 50 mg by mouth every 6 hours as needed for Pain. [place] Monas atorvastatin (LIPITOR) 80 MG tablet TAKE ONE TABLET BY MOUTH NIGHTLY 90 tablet 3    omeprazole (PRILOSEC) 20 MG delayed release capsule TAKE 1 CAPSULE BY MOUTH DAILY 90 capsule 3    clopidogrel (PLAVIX) 75 MG tablet TAKE ONE TABLET BY MOUTH DAILY. 90 tablet 3    carvedilol (COREG) 12.5 MG tablet TAKE 1 TABLET BY MOUTH 2 TIMES DAILY (WITH MEALS) 180 tablet 3    SENEXON-S 8.6-50 MG per tablet TAKE ONE TABLET BY MOUTH TWO TIMES A DAY 60 tablet 11    Handicap Placard MISC by Does not apply route Duration 5 years 1 each 0    albuterol sulfate HFA (PROAIR HFA) 108 (90 BASE) MCG/ACT inhaler Inhale 2 puffs into the lungs every 6 hours as needed for Wheezing 1 Inhaler 3    aspirin 81 MG EC tablet Take 1 tablet by mouth nightly. 30 tablet 3    Multiple Vitamins-Minerals (CENTRUM SILVER PO) Take 1 tablet by mouth daily.  alendronate (FOSAMAX) 70 MG tablet Take 1 tablet by mouth every 7 days 12 tablet 3    Compression Stockings MISC by Does not apply route Bilateral knee high compression stockings, 15-20 mmHg daily for leg swelling 2 each 1     No current facility-administered medications on file prior to visit.       Data Reviewed:   CBC and Renal reviewed  Last CBC  Lab Results   Component Value Date    WBC 11.7 12/19/2018    RBC 3.96 12/19/2018    RBC 3.74 06/07/2017    HGB 12.8 12/19/2018    .5 12/19/2018     12/19/2018     Last Renal  Lab Results   Component Value Date     05/22/2019    K 4.5 05/22/2019    K 4.5 04/21/2018     05/22/2019    CO2 24 05/22/2019    CO2 27 12/19/2018    CO2 23 07/31/2018    BUN 25 05/22/2019    CREATININE 0.8 05/22/2019    GLUCOSE 99 05/22/2019    GLUCOSE 104 06/07/2017    CALCIUM 9.4 05/22/2019       Last ABG  POC Blood Gas:   No results found for: POCPH  No results for input(s): PH, PCO2, PO2, HCO3, BE, O2SAT in the last 72 hours. Chest imaging reports were reviewed and imaging was reviewed by me and showed pulmonary nodule in the right middle lobe without adenopathy    Assessment:       · COPD, Moderately severe, FEV1 55%, DLCO 39%  · Pulmonary nodule  · Smoking, tobacco abuse - quit  · Cachexia / weight loss    Plan:         Problem List Items Addressed This Visit     Chronic respiratory failure with hypoxia ([address]     Continues to need and benefit from 2-4 L NC. Centrilobular emphysema ([address] - Primary     Increased shortness of breath. Not clearly in flair. Will start Incruse and given 12 days of prednisone. Spiriva did not help her. Will complete PA if Incruse helps but will be challenge to assess if was Incruse vs prednisone that improved her symptoms    She has osteoporosis and advised of risk but risk is minimal given short duration of steroids.           Relevant Medications    predniSONE (DELTASONE) 10 MG tablet

## 2019-06-05 NOTE — Clinical Note
I think she is worsening over all. Will try Incruse but Spiriva did not help. See full note attached.   Thanks, Marcello Shafer

## 2019-07-01 ENCOUNTER — TELEPHONE (OUTPATIENT)
Dept: PULMONOLOGY | Age: 79
End: 2019-07-01

## 2019-07-01 DIAGNOSIS — J43.2 CENTRILOBULAR EMPHYSEMA (HCC): Primary | ICD-10-CM

## 2019-07-01 RX ORDER — PREDNISONE 10 MG/1
TABLET ORAL
Qty: 30 TABLET | Refills: 0 | Status: SHIPPED | OUTPATIENT
Start: 2019-07-01 | End: 2019-07-11

## 2019-07-01 NOTE — TELEPHONE ENCOUNTER
Feeling sob past 3 days  No chest pain  Used emergency inhaler but didn't feel like it has been helping. Cant get up and walk around. Patient said prednisone helped when prescribed 6/5/19.   Oxygen levels: 88%- 92%   Please advise

## 2019-07-20 ENCOUNTER — APPOINTMENT (OUTPATIENT)
Dept: GENERAL RADIOLOGY | Age: 79
DRG: 191 | End: 2019-07-20
Payer: MEDICARE

## 2019-07-20 ENCOUNTER — HOSPITAL ENCOUNTER (INPATIENT)
Age: 79
LOS: 1 days | Discharge: HOME | DRG: 191 | End: 2019-07-22
Attending: EMERGENCY MEDICINE | Admitting: INTERNAL MEDICINE
Payer: MEDICARE

## 2019-07-20 DIAGNOSIS — I50.20 SYSTOLIC CONGESTIVE HEART FAILURE, UNSPECIFIED HF CHRONICITY (HCC): ICD-10-CM

## 2019-07-20 DIAGNOSIS — J44.1 COPD EXACERBATION (HCC): Primary | ICD-10-CM

## 2019-07-20 LAB
A/G RATIO: 1.5 (ref 1.1–2.2)
ALBUMIN SERPL-MCNC: 3.8 G/DL (ref 3.4–5)
ALP BLD-CCNC: 61 U/L (ref 40–129)
ALT SERPL-CCNC: 20 U/L (ref 10–40)
ANION GAP SERPL CALCULATED.3IONS-SCNC: 15 MMOL/L (ref 3–16)
APTT: 39 SEC (ref 26–36)
AST SERPL-CCNC: 28 U/L (ref 15–37)
BASE EXCESS ARTERIAL: 2 MMOL/L (ref -3–3)
BASOPHILS ABSOLUTE: 0 K/UL (ref 0–0.2)
BASOPHILS RELATIVE PERCENT: 0.5 %
BILIRUB SERPL-MCNC: 0.6 MG/DL (ref 0–1)
BUN BLDV-MCNC: 22 MG/DL (ref 7–20)
CALCIUM SERPL-MCNC: 8.9 MG/DL (ref 8.3–10.6)
CARBOXYHEMOGLOBIN ARTERIAL: 1.5 % (ref 0–1.5)
CHLORIDE BLD-SCNC: 101 MMOL/L (ref 99–110)
CO2: 25 MMOL/L (ref 21–32)
CREAT SERPL-MCNC: 0.9 MG/DL (ref 0.6–1.2)
EOSINOPHILS ABSOLUTE: 0.2 K/UL (ref 0–0.6)
EOSINOPHILS RELATIVE PERCENT: 2.1 %
GFR AFRICAN AMERICAN: >60
GFR NON-AFRICAN AMERICAN: >60
GLOBULIN: 2.5 G/DL
GLUCOSE BLD-MCNC: 111 MG/DL (ref 70–99)
HCO3 ARTERIAL: 24.6 MMOL/L (ref 21–29)
HCT VFR BLD CALC: 36.7 % (ref 36–48)
HEMOGLOBIN, ART, EXTENDED: 10.8 G/DL (ref 12–16)
HEMOGLOBIN: 12.2 G/DL (ref 12–16)
INR BLD: 1.08 (ref 0.86–1.14)
LYMPHOCYTES ABSOLUTE: 0.8 K/UL (ref 1–5.1)
LYMPHOCYTES RELATIVE PERCENT: 9.1 %
MCH RBC QN AUTO: 31.3 PG (ref 26–34)
MCHC RBC AUTO-ENTMCNC: 33.4 G/DL (ref 31–36)
MCV RBC AUTO: 93.9 FL (ref 80–100)
METHEMOGLOBIN ARTERIAL: 0.6 %
MONOCYTES ABSOLUTE: 0.9 K/UL (ref 0–1.3)
MONOCYTES RELATIVE PERCENT: 9.7 %
NEUTROPHILS ABSOLUTE: 6.9 K/UL (ref 1.7–7.7)
NEUTROPHILS RELATIVE PERCENT: 78.6 %
O2 CONTENT ARTERIAL: 13 ML/DL
O2 SAT, ARTERIAL: 89.5 %
O2 THERAPY: ABNORMAL
PCO2 ARTERIAL: 32.6 MMHG (ref 35–45)
PDW BLD-RTO: 14.8 % (ref 12.4–15.4)
PH ARTERIAL: 7.49 (ref 7.35–7.45)
PLATELET # BLD: 199 K/UL (ref 135–450)
PMV BLD AUTO: 6.8 FL (ref 5–10.5)
PO2 ARTERIAL: 53 MMHG (ref 75–108)
POTASSIUM REFLEX MAGNESIUM: 4.2 MMOL/L (ref 3.5–5.1)
PRO-BNP: 3662 PG/ML (ref 0–449)
PROTHROMBIN TIME: 12.3 SEC (ref 9.8–13)
RBC # BLD: 3.9 M/UL (ref 4–5.2)
SODIUM BLD-SCNC: 141 MMOL/L (ref 136–145)
TCO2 ARTERIAL: 25.6 MMOL/L
TOTAL PROTEIN: 6.3 G/DL (ref 6.4–8.2)
TROPONIN: <0.01 NG/ML
WBC # BLD: 8.8 K/UL (ref 4–11)

## 2019-07-20 PROCEDURE — 71045 X-RAY EXAM CHEST 1 VIEW: CPT

## 2019-07-20 PROCEDURE — 6370000000 HC RX 637 (ALT 250 FOR IP): Performed by: EMERGENCY MEDICINE

## 2019-07-20 PROCEDURE — 85730 THROMBOPLASTIN TIME PARTIAL: CPT

## 2019-07-20 PROCEDURE — 85025 COMPLETE CBC W/AUTO DIFF WBC: CPT

## 2019-07-20 PROCEDURE — 2700000000 HC OXYGEN THERAPY PER DAY

## 2019-07-20 PROCEDURE — 96374 THER/PROPH/DIAG INJ IV PUSH: CPT

## 2019-07-20 PROCEDURE — 82803 BLOOD GASES ANY COMBINATION: CPT

## 2019-07-20 PROCEDURE — 85610 PROTHROMBIN TIME: CPT

## 2019-07-20 PROCEDURE — 83880 ASSAY OF NATRIURETIC PEPTIDE: CPT

## 2019-07-20 PROCEDURE — 99285 EMERGENCY DEPT VISIT HI MDM: CPT

## 2019-07-20 PROCEDURE — 94640 AIRWAY INHALATION TREATMENT: CPT

## 2019-07-20 PROCEDURE — 94761 N-INVAS EAR/PLS OXIMETRY MLT: CPT

## 2019-07-20 PROCEDURE — 36415 COLL VENOUS BLD VENIPUNCTURE: CPT

## 2019-07-20 PROCEDURE — 6360000002 HC RX W HCPCS: Performed by: EMERGENCY MEDICINE

## 2019-07-20 PROCEDURE — 80053 COMPREHEN METABOLIC PANEL: CPT

## 2019-07-20 PROCEDURE — 84484 ASSAY OF TROPONIN QUANT: CPT

## 2019-07-20 PROCEDURE — 36600 WITHDRAWAL OF ARTERIAL BLOOD: CPT

## 2019-07-20 PROCEDURE — 93005 ELECTROCARDIOGRAM TRACING: CPT | Performed by: EMERGENCY MEDICINE

## 2019-07-20 RX ORDER — METHYLPREDNISOLONE SODIUM SUCCINATE 125 MG/2ML
125 INJECTION, POWDER, LYOPHILIZED, FOR SOLUTION INTRAMUSCULAR; INTRAVENOUS ONCE
Status: COMPLETED | OUTPATIENT
Start: 2019-07-20 | End: 2019-07-20

## 2019-07-20 RX ORDER — IPRATROPIUM BROMIDE AND ALBUTEROL SULFATE 2.5; .5 MG/3ML; MG/3ML
1 SOLUTION RESPIRATORY (INHALATION) ONCE
Status: COMPLETED | OUTPATIENT
Start: 2019-07-20 | End: 2019-07-20

## 2019-07-20 RX ADMIN — IPRATROPIUM BROMIDE AND ALBUTEROL SULFATE 1 AMPULE: .5; 3 SOLUTION RESPIRATORY (INHALATION) at 22:53

## 2019-07-20 RX ADMIN — METHYLPREDNISOLONE SODIUM SUCCINATE 125 MG: 125 INJECTION, POWDER, FOR SOLUTION INTRAMUSCULAR; INTRAVENOUS at 22:40

## 2019-07-20 ASSESSMENT — PAIN SCALES - GENERAL: PAINLEVEL_OUTOF10: 0

## 2019-07-21 PROBLEM — J44.1 COPD EXACERBATION (HCC): Status: ACTIVE | Noted: 2019-07-21

## 2019-07-21 LAB
EKG ATRIAL RATE: 73 BPM
EKG DIAGNOSIS: NORMAL
EKG P AXIS: 91 DEGREES
EKG P-R INTERVAL: 148 MS
EKG Q-T INTERVAL: 418 MS
EKG QRS DURATION: 82 MS
EKG QTC CALCULATION (BAZETT): 460 MS
EKG R AXIS: -55 DEGREES
EKG T AXIS: 236 DEGREES
EKG VENTRICULAR RATE: 73 BPM
REPORT: NORMAL
RESPIRATORY PANEL PCR: NORMAL

## 2019-07-21 PROCEDURE — 87641 MR-STAPH DNA AMP PROBE: CPT

## 2019-07-21 PROCEDURE — 6370000000 HC RX 637 (ALT 250 FOR IP): Performed by: NURSE PRACTITIONER

## 2019-07-21 PROCEDURE — 6360000002 HC RX W HCPCS: Performed by: NURSE PRACTITIONER

## 2019-07-21 PROCEDURE — 94760 N-INVAS EAR/PLS OXIMETRY 1: CPT

## 2019-07-21 PROCEDURE — 94640 AIRWAY INHALATION TREATMENT: CPT

## 2019-07-21 PROCEDURE — 87486 CHLMYD PNEUM DNA AMP PROBE: CPT

## 2019-07-21 PROCEDURE — 6370000000 HC RX 637 (ALT 250 FOR IP): Performed by: INTERNAL MEDICINE

## 2019-07-21 PROCEDURE — 1200000000 HC SEMI PRIVATE

## 2019-07-21 PROCEDURE — 99223 1ST HOSP IP/OBS HIGH 75: CPT | Performed by: INTERNAL MEDICINE

## 2019-07-21 PROCEDURE — 2700000000 HC OXYGEN THERAPY PER DAY

## 2019-07-21 PROCEDURE — 87798 DETECT AGENT NOS DNA AMP: CPT

## 2019-07-21 PROCEDURE — 93010 ELECTROCARDIOGRAM REPORT: CPT | Performed by: INTERNAL MEDICINE

## 2019-07-21 PROCEDURE — 2580000003 HC RX 258: Performed by: INTERNAL MEDICINE

## 2019-07-21 PROCEDURE — 6360000002 HC RX W HCPCS: Performed by: INTERNAL MEDICINE

## 2019-07-21 PROCEDURE — 87633 RESP VIRUS 12-25 TARGETS: CPT

## 2019-07-21 PROCEDURE — 87581 M.PNEUMON DNA AMP PROBE: CPT

## 2019-07-21 RX ORDER — IPRATROPIUM BROMIDE AND ALBUTEROL SULFATE 2.5; .5 MG/3ML; MG/3ML
1 SOLUTION RESPIRATORY (INHALATION)
Status: DISCONTINUED | OUTPATIENT
Start: 2019-07-21 | End: 2019-07-22 | Stop reason: HOSPADM

## 2019-07-21 RX ORDER — THEOPHYLLINE ANHYDROUS 200 MG
200 TABLET, EXTENDED RELEASE 12 HR ORAL DAILY
Status: DISCONTINUED | OUTPATIENT
Start: 2019-07-21 | End: 2019-07-21 | Stop reason: CLARIF

## 2019-07-21 RX ORDER — FUROSEMIDE 10 MG/ML
20 INJECTION INTRAMUSCULAR; INTRAVENOUS DAILY
Status: DISCONTINUED | OUTPATIENT
Start: 2019-07-21 | End: 2019-07-21

## 2019-07-21 RX ORDER — METHYLPREDNISOLONE SODIUM SUCCINATE 40 MG/ML
40 INJECTION, POWDER, LYOPHILIZED, FOR SOLUTION INTRAMUSCULAR; INTRAVENOUS EVERY 6 HOURS
Status: DISCONTINUED | OUTPATIENT
Start: 2019-07-21 | End: 2019-07-22 | Stop reason: HOSPADM

## 2019-07-21 RX ORDER — SODIUM CHLORIDE 0.9 % (FLUSH) 0.9 %
10 SYRINGE (ML) INJECTION EVERY 12 HOURS SCHEDULED
Status: DISCONTINUED | OUTPATIENT
Start: 2019-07-21 | End: 2019-07-22 | Stop reason: HOSPADM

## 2019-07-21 RX ORDER — SODIUM CHLORIDE 0.9 % (FLUSH) 0.9 %
10 SYRINGE (ML) INJECTION PRN
Status: DISCONTINUED | OUTPATIENT
Start: 2019-07-21 | End: 2019-07-22 | Stop reason: HOSPADM

## 2019-07-21 RX ORDER — CLOPIDOGREL BISULFATE 75 MG/1
75 TABLET ORAL DAILY
Status: DISCONTINUED | OUTPATIENT
Start: 2019-07-21 | End: 2019-07-22 | Stop reason: HOSPADM

## 2019-07-21 RX ORDER — CARVEDILOL 12.5 MG/1
12.5 TABLET ORAL 2 TIMES DAILY WITH MEALS
Status: DISCONTINUED | OUTPATIENT
Start: 2019-07-21 | End: 2019-07-22 | Stop reason: HOSPADM

## 2019-07-21 RX ORDER — ONDANSETRON 2 MG/ML
4 INJECTION INTRAMUSCULAR; INTRAVENOUS EVERY 6 HOURS PRN
Status: DISCONTINUED | OUTPATIENT
Start: 2019-07-21 | End: 2019-07-22 | Stop reason: HOSPADM

## 2019-07-21 RX ORDER — ASPIRIN 81 MG/1
81 TABLET ORAL NIGHTLY
Status: DISCONTINUED | OUTPATIENT
Start: 2019-07-21 | End: 2019-07-22 | Stop reason: HOSPADM

## 2019-07-21 RX ORDER — LEVOFLOXACIN 5 MG/ML
250 INJECTION, SOLUTION INTRAVENOUS EVERY 24 HOURS
Status: DISCONTINUED | OUTPATIENT
Start: 2019-07-22 | End: 2019-07-22 | Stop reason: HOSPADM

## 2019-07-21 RX ORDER — LACTOBACILLUS RHAMNOSUS GG 10B CELL
1 CAPSULE ORAL 2 TIMES DAILY WITH MEALS
Status: DISCONTINUED | OUTPATIENT
Start: 2019-07-21 | End: 2019-07-22 | Stop reason: HOSPADM

## 2019-07-21 RX ORDER — LEVOFLOXACIN 5 MG/ML
500 INJECTION, SOLUTION INTRAVENOUS ONCE
Status: COMPLETED | OUTPATIENT
Start: 2019-07-21 | End: 2019-07-21

## 2019-07-21 RX ORDER — PREDNISONE 20 MG/1
40 TABLET ORAL DAILY
Status: DISCONTINUED | OUTPATIENT
Start: 2019-07-23 | End: 2019-07-22 | Stop reason: HOSPADM

## 2019-07-21 RX ORDER — PANTOPRAZOLE SODIUM 40 MG/1
40 TABLET, DELAYED RELEASE ORAL
Status: DISCONTINUED | OUTPATIENT
Start: 2019-07-22 | End: 2019-07-22 | Stop reason: HOSPADM

## 2019-07-21 RX ORDER — THEOPHYLLINE 400 MG/1
200 TABLET, EXTENDED RELEASE ORAL DAILY
Status: DISCONTINUED | OUTPATIENT
Start: 2019-07-21 | End: 2019-07-22 | Stop reason: HOSPADM

## 2019-07-21 RX ORDER — FUROSEMIDE 20 MG/1
20 TABLET ORAL DAILY
Status: DISCONTINUED | OUTPATIENT
Start: 2019-07-21 | End: 2019-07-22 | Stop reason: HOSPADM

## 2019-07-21 RX ORDER — AMLODIPINE BESYLATE 5 MG/1
5 TABLET ORAL DAILY
Status: DISCONTINUED | OUTPATIENT
Start: 2019-07-21 | End: 2019-07-22 | Stop reason: HOSPADM

## 2019-07-21 RX ORDER — ATORVASTATIN CALCIUM 40 MG/1
40 TABLET, FILM COATED ORAL NIGHTLY
Status: DISCONTINUED | OUTPATIENT
Start: 2019-07-21 | End: 2019-07-22 | Stop reason: HOSPADM

## 2019-07-21 RX ADMIN — IPRATROPIUM BROMIDE AND ALBUTEROL SULFATE 1 AMPULE: .5; 3 SOLUTION RESPIRATORY (INHALATION) at 12:12

## 2019-07-21 RX ADMIN — LEVOFLOXACIN 500 MG: 5 INJECTION, SOLUTION INTRAVENOUS at 09:47

## 2019-07-21 RX ADMIN — AMLODIPINE BESYLATE 5 MG: 5 TABLET ORAL at 08:28

## 2019-07-21 RX ADMIN — MOMETASONE FUROATE AND FORMOTEROL FUMARATE DIHYDRATE 2 PUFF: 100; 5 AEROSOL RESPIRATORY (INHALATION) at 07:59

## 2019-07-21 RX ADMIN — IPRATROPIUM BROMIDE AND ALBUTEROL SULFATE 1 AMPULE: .5; 3 SOLUTION RESPIRATORY (INHALATION) at 19:49

## 2019-07-21 RX ADMIN — IPRATROPIUM BROMIDE AND ALBUTEROL SULFATE 1 AMPULE: .5; 3 SOLUTION RESPIRATORY (INHALATION) at 07:59

## 2019-07-21 RX ADMIN — ASPIRIN 81 MG: 81 TABLET, COATED ORAL at 21:31

## 2019-07-21 RX ADMIN — Medication 10 ML: at 08:29

## 2019-07-21 RX ADMIN — IPRATROPIUM BROMIDE AND ALBUTEROL SULFATE 1 AMPULE: .5; 3 SOLUTION RESPIRATORY (INHALATION) at 16:13

## 2019-07-21 RX ADMIN — METHYLPREDNISOLONE SODIUM SUCCINATE 40 MG: 40 INJECTION, POWDER, FOR SOLUTION INTRAMUSCULAR; INTRAVENOUS at 15:36

## 2019-07-21 RX ADMIN — CLOPIDOGREL BISULFATE 75 MG: 75 TABLET ORAL at 08:28

## 2019-07-21 RX ADMIN — MOMETASONE FUROATE AND FORMOTEROL FUMARATE DIHYDRATE 2 PUFF: 100; 5 AEROSOL RESPIRATORY (INHALATION) at 19:49

## 2019-07-21 RX ADMIN — FUROSEMIDE 20 MG: 20 TABLET ORAL at 08:28

## 2019-07-21 RX ADMIN — ATORVASTATIN CALCIUM 40 MG: 40 TABLET, FILM COATED ORAL at 21:31

## 2019-07-21 RX ADMIN — Medication 1 CAPSULE: at 09:46

## 2019-07-21 RX ADMIN — CARVEDILOL 12.5 MG: 12.5 TABLET, FILM COATED ORAL at 18:11

## 2019-07-21 RX ADMIN — CARVEDILOL 12.5 MG: 12.5 TABLET, FILM COATED ORAL at 08:28

## 2019-07-21 RX ADMIN — METHYLPREDNISOLONE SODIUM SUCCINATE 40 MG: 40 INJECTION, POWDER, FOR SOLUTION INTRAMUSCULAR; INTRAVENOUS at 04:57

## 2019-07-21 RX ADMIN — THEOPHYLLINE 200 MG: 400 TABLET, EXTENDED RELEASE ORAL at 15:36

## 2019-07-21 RX ADMIN — Medication 10 ML: at 21:23

## 2019-07-21 RX ADMIN — METHYLPREDNISOLONE SODIUM SUCCINATE 40 MG: 40 INJECTION, POWDER, FOR SOLUTION INTRAMUSCULAR; INTRAVENOUS at 09:46

## 2019-07-21 RX ADMIN — ENOXAPARIN SODIUM 30 MG: 30 INJECTION SUBCUTANEOUS at 08:28

## 2019-07-21 RX ADMIN — METHYLPREDNISOLONE SODIUM SUCCINATE 40 MG: 40 INJECTION, POWDER, FOR SOLUTION INTRAMUSCULAR; INTRAVENOUS at 21:23

## 2019-07-21 RX ADMIN — Medication 1 CAPSULE: at 18:12

## 2019-07-21 ASSESSMENT — PAIN SCALES - GENERAL
PAINLEVEL_OUTOF10: 0

## 2019-07-21 NOTE — ED PROVIDER NOTES
[facility]  eMERGENCY dEPARTMENT eNCOUnter      Pt Name: Chance Orta  MRN: [id number]  [place] 1940  Date of evaluation: 7/20/2019  Provider: Renee Kapoor MD  PCP: Elan Menon MD      [address] [place] [address]       Chief Complaint   Patient presents with    Shortness of Breath       HISTORY OFPRESENT ILLNESS   (Location/Symptom, Timing/Onset, Context/Setting, Quality, Duration, Modifying Factors,Severity)  Note limiting factors. Chance Orta is a 70 y.o. female presents with complaints of shortness of breath she states that she was on steroids about a week ago and seemed to get better than she stopped taking them and it seemed to get worse she denies any fever or chills she denies any cough she does have COPD and CHF and is on 2 L nasal cannula constantly    Nursing Notes were all reviewed and agreed with or any disagreements were addressed  in the HPI. REVIEW OF SYSTEMS    (2-9 systems for level 4, 10 or more for level 5)     Review of Systems    Positives and Pertinent negatives as per HPI. Except as noted above in the ROS, all other systems were reviewed andnegative.        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 Presley Precise    Carotid stenosis 07/2014    bilat 50-79% 9/2014    Chronic respiratory failure with hypoxia ([address]     HOME O2    COPD (chronic obstructive pulmonary disease) ([address] 06/9897    Diastolic CHF ([address] 58/9643    7/2017 ECHO = EF 60% + GRADE I DD    HTN (hypertension)     Incarcerated right inguinal hernia 08/2017    REDUCED IN ED    Left inguinal hernia     Macular hemorrhage of left eye 02/20/2017    Dr. Julianna Cervantes NSTEMI (non-ST elevated myocardial infarction) [facility])     [facility]     Osteoporosis     PAD (peripheral artery disease) ([facility]     SEVERE    Pulmonary HTN ([facility] 07/2017 7/2017 ECHO = RVSP @ 58 + MILD TR    ZI (renal artery stenosis) ([facility])     SBO (small bowel obstruction) ([facility] 08/2017    2/2 HERNIA  and 4/21/18    Small cell lung cancer ([facility] 09/15/2014    SCCA 12 mm :  Dr. Qureshi Search : [place]  /  (R) WEDGE RESECTION  /  8/2015 RECURRANCE W/ NORA STEROTATIC RADIOSURGERY    Wears glasses     reading    Weight loss     25 lbs over last 4 yrs         SURGICAL HISTORY       Past Surgical History:   Procedure Laterality Date    CATARACT REMOVAL Bilateral 2011    Dr. Tessa Henriquez Right 08/2017    Dr. Tom Brush Left 04/21/2018    Dr. Ike Palacios Right 9/15/14    wedge excision RLL nodule - squamous cell    THORACOTOMY Right 9/15/14    Right VATS with wedge excision right lower lung nodule:  Dr. Briana Terrazas  age 26   [facility] UPPER GASTROINTESTINAL ENDOSCOPY  4-20-15         CURRENT MEDICATIONS       Previous Medications    ADVAIR -21 MCG/ACT INHALER    INHALE TWO PUFFS INTO THE LUNGS TWO TIMES A DAY. ALBUTEROL SULFATE HFA (PROAIR HFA) 108 (90 BASE) MCG/ACT INHALER    Inhale 2 puffs into the lungs every 6 hours as needed for Wheezing    ALENDRONATE (FOSAMAX) 70 MG TABLET    Take 1 tablet by mouth every 7 days    AMLODIPINE (NORVASC) 10 MG TABLET    TAKE ONE TABLET BY MOUTH DAILY. ASPIRIN 81 MG EC TABLET    Take 1 tablet by mouth nightly. ATORVASTATIN (LIPITOR) 80 MG TABLET    TAKE ONE TABLET BY MOUTH NIGHTLY    CARVEDILOL (COREG) 12.5 MG TABLET    TAKE 1 TABLET BY MOUTH 2 TIMES DAILY (WITH MEALS)    CLOPIDOGREL (PLAVIX) 75 MG TABLET    TAKE ONE TABLET BY MOUTH DAILY.     COMPRESSION STOCKINGS MISC    by Does not apply route Bilateral knee high compression stockings, 15-20 mmHg daily for leg swelling FUROSEMIDE (LASIX) 20 MG TABLET    TAKE ONE TABLET BY MOUTH EVERY OTHER DAY. HANDICAP PLACARD MISC    by Does not apply route Duration 5 years    MULTIPLE VITAMINS-MINERALS (CENTRUM SILVER PO)    Take 1 tablet by mouth daily. NORTRIPTYLINE (PAMELOR) 10 MG CAPSULE    Take 1 capsule by mouth nightly    NUTRITIONAL SUPPLEMENTS (ENSURE ENLIVE) LIQD    One or 2 bottles daily (ok to give whichever brand is covered)    OMEPRAZOLE (PRILOSEC) 20 MG DELAYED RELEASE CAPSULE    TAKE 1 CAPSULE BY MOUTH DAILY    POTASSIUM CHLORIDE (KLOR-CON M) 20 MEQ EXTENDED RELEASE TABLET    TAKE ONE TABLET BY MOUTH EVERY OTHER DAY WITH LASIX (GENERIC NAME IS FUROSEMIDE)    SENEXON-S 8.6-50 MG PER TABLET    TAKE ONE TABLET BY MOUTH TWO TIMES A DAY    TRAMADOL (ULTRAM) 50 MG TABLET    Take 50 mg by mouth every 6 hours as needed for Pain. Mireya Navarro UMECLIDINIUM BROMIDE (INCRUSE ELLIPTA) 62.5 MCG/INH AEPB    Inhale 1 puff into the lungs daily       ALLERGIES     Ace inhibitors    FAMILY HISTORY       Family History   Problem Relation Age of Onset    Cancer Mother         lymphoma  in 68's    Heart Disease Mother     Heart Attack Father         late onset    Diabetes Father           SOCIAL HISTORY       Social History     Socioeconomic History    Marital status:      Spouse name: Reva Pearson -  16    Number of children: 2    Years of education: None    Highest education level: None   Occupational History    Occupation: Retired    Social Needs    Financial resource strain: None    Food insecurity:     Worry: None     Inability: None    Transportation needs:     Medical: None     Non-medical: None   Tobacco Use    Smoking status: Former Smoker     Packs/day: 1.00     Years: 53.00     Pack years: 53.00     Types: Cigarettes     Last attempt to quit: 9/15/2012     Years since quittin.8    Smokeless tobacco: Never Used    Tobacco comment: abstaining.    Substance and Sexual Activity    Alcohol use: No    were within normal range or not returned as of this dictation. EKG: All EKG's are interpreted by the Emergency Department Physician who eithersigns or Co-signs this chart in the absence of a cardiologist.    The Ekg interpreted by me in the absence of a cardiologist shows. Normal Sinus rhythm   Rate of   73  Axis is   Normal  QTc is  within an acceptable range  Intervals and Durations are unremarkable. Nonspecific ST-T wave changes appreciated. No evidence of acute ischemia. RADIOLOGY:   Non-plain film images such as CT, Ultrasound and MRI are read by the radiologist. Plain radiographic images are visualized by myself. *    Interpretation per the Radiologist below, if available at the time of this note:    XR CHEST PORTABLE   Final Result   Increased interstitial opacity, along with small bilateral pleural effusions. Though some of that interstitial opacity appears chronic, it does appear   increased, and therefore correlate with clinical evidence of interstitial   edema. PROCEDURES   Unless otherwise noted below, none     Procedures    *    CRITICAL CARE TIME   N/A      EMERGENCY DEPARTMENT COURSE and DIFFERENTIALDIAGNOSIS/MDM:   Vitals:    Vitals:    07/20/19 2252 07/20/19 2253 07/20/19 2300 07/20/19 2313   BP:       Pulse: 69 69 71    Resp: 14 20 20 19   Temp:       TempSrc:       SpO2: 93% 91%  90%   Weight:       Height:           Patient was given thefollowing medications:  Medications   ipratropium-albuterol (DUONEB) nebulizer solution 1 ampule (1 ampule Inhalation Given 7/20/19 2253)   methylPREDNISolone sodium (SOLU-MEDROL) injection 125 mg (125 mg Intravenous Given 7/20/19 2240)           The patient tolerated their visit well. The patient and / or the familywere informed of the results of any tests, a time was given to answer questions. FINAL IMPRESSION      1. COPD exacerbation ([address]    2.  Systolic congestive heart failure, unspecified HF chronicity ([address]

## 2019-07-21 NOTE — CONSULTS
yrs       PAST SURGICAL HISTORY:  Past Surgical History:   Procedure Laterality Date    CATARACT REMOVAL Bilateral 2011    Dr. Charlotte Arredondo Right 08/2017    Dr. Ebonie Womack Left 04/21/2018    Dr. Larry Bonilla Right 9/15/14    wedge excision RLL nodule - squamous cell    THORACOTOMY Right 9/15/14    Right VATS with wedge excision right lower lung nodule:  Dr. Johanne Zavala EXTRACTION  age 27    UPPER GASTROINTESTINAL ENDOSCOPY  4-20-15       FAMILY HISTORY:  family history includes Cancer in her mother; Diabetes in her father; Heart Attack in her father; Heart Disease in her mother. SOCIAL HISTORY:   reports that she quit smoking about 6 years ago. Her smoking use included cigarettes. She has a 53.00 pack-year smoking history. She has never used smokeless tobacco.    Scheduled Meds:   mometasone-formoterol  2 puff Inhalation BID    amLODIPine  5 mg Oral Daily    aspirin  81 mg Oral Nightly    atorvastatin  40 mg Oral Nightly    carvedilol  12.5 mg Oral BID WC    clopidogrel  75 mg Oral Daily    sodium chloride flush  10 mL Intravenous 2 times per day    ipratropium-albuterol  1 ampule Inhalation Q4H WA    methylPREDNISolone  40 mg Intravenous Q6H    Followed by   Daneil Clear ON 7/23/2019] predniSONE  40 mg Oral Daily    enoxaparin  30 mg Subcutaneous Daily    furosemide  20 mg Oral Daily    lactobacillus  1 capsule Oral BID WC    [START ON 7/22/2019] levofloxacin  250 mg Intravenous Q24H       Continuous Infusions:      PRN Meds:  sodium chloride flush, ondansetron    ALLERGIES:  Patient is allergic to ace inhibitors. REVIEW OF SYSTEMS:  Constitutional: Negative for fever  HENT: Negative for sore throat  Eyes: Negative for redness   Respiratory: Increased shortness of breath, no cough.   Cardiovascular: Negative for chest pain  Gastrointestinal: Negative Initial    FINDINGS:  Cardiac silhouette is normal in size. Hyperinflation and changes consistent  with COPD. Postsurgical changes in the mid right lung again noted. Mild  scarring within the right mid and upper lung. Somewhat lobular opacity in  the left upper lung is unchanged. Osteopenia. Compression deformities  involving the mid to upper thoracic vertebral bodies, similar to CT of the  chest July 17, 2018      Impression No significant change in appearance of the chest since the April 21, 2018  radiograph. CXR portable:   Results for orders placed during the hospital encounter of 07/20/19   XR CHEST PORTABLE    Narrative EXAMINATION:  ONE XRAY VIEW OF THE CHEST    7/20/2019 7:40 pm    COMPARISON:  10/26/2018    HISTORY:  ORDERING SYSTEM PROVIDED HISTORY: sob  TECHNOLOGIST PROVIDED HISTORY:  Reason for exam:->sob  Reason for Exam: sob  Acuity: Acute  Type of Exam: Initial    FINDINGS:  There is blunting of the costophrenic angles bilaterally. The pulmonary  vasculature appears congested and there is increased interstitial opacity. No pneumothorax is found. No free air. No acute bony abnormality. Cardial-pericardial silhouette is stable. Impression Increased interstitial opacity, along with small bilateral pleural effusions. Though some of that interstitial opacity appears chronic, it does appear  increased, and therefore correlate with clinical evidence of interstitial  edema. Assessment:       COPD, FEV1 55%, DLCO 39%  Pulmonary nodules  Pulmonary cachexia  Shortness of breath  History of heart failure, grade 1 diastolic dysfunction    Plan:      Shortness of breath  -I agree with treating her as a COPD exacerbation. No signs of CHF exacerbation. However, this may be not be a COPD exacerbation. This could also just be worsening of her COPD. -Trial of switching medications as an outpatient did not work well.   She will continue on her home Spiriva and Symbicort.  -Continue

## 2019-07-21 NOTE — ED NOTES
RN and MD Robbins Postin at bedside for pt plan of care update.       Miguelina Vincent RN  07/21/19 [id number]

## 2019-07-22 VITALS
BODY MASS INDEX: 14.86 KG/M2 | HEIGHT: 61 IN | RESPIRATION RATE: 16 BRPM | SYSTOLIC BLOOD PRESSURE: 146 MMHG | WEIGHT: 78.7 LBS | DIASTOLIC BLOOD PRESSURE: 69 MMHG | HEART RATE: 78 BPM | TEMPERATURE: 97.5 F | OXYGEN SATURATION: 96 %

## 2019-07-22 DIAGNOSIS — I10 ESSENTIAL HYPERTENSION, BENIGN: ICD-10-CM

## 2019-07-22 LAB
ANION GAP SERPL CALCULATED.3IONS-SCNC: 14 MMOL/L (ref 3–16)
BASOPHILS ABSOLUTE: 0 K/UL (ref 0–0.2)
BASOPHILS RELATIVE PERCENT: 0.1 %
BUN BLDV-MCNC: 25 MG/DL (ref 7–20)
CALCIUM SERPL-MCNC: 9 MG/DL (ref 8.3–10.6)
CHLORIDE BLD-SCNC: 100 MMOL/L (ref 99–110)
CO2: 25 MMOL/L (ref 21–32)
CREAT SERPL-MCNC: 0.8 MG/DL (ref 0.6–1.2)
EOSINOPHILS ABSOLUTE: 0 K/UL (ref 0–0.6)
EOSINOPHILS RELATIVE PERCENT: 0 %
GFR AFRICAN AMERICAN: >60
GFR NON-AFRICAN AMERICAN: >60
GLUCOSE BLD-MCNC: 131 MG/DL (ref 70–99)
HCT VFR BLD CALC: 33.5 % (ref 36–48)
HEMOGLOBIN: 11.2 G/DL (ref 12–16)
LYMPHOCYTES ABSOLUTE: 0.6 K/UL (ref 1–5.1)
LYMPHOCYTES RELATIVE PERCENT: 3.8 %
MCH RBC QN AUTO: 31.2 PG (ref 26–34)
MCHC RBC AUTO-ENTMCNC: 33.3 G/DL (ref 31–36)
MCV RBC AUTO: 93.8 FL (ref 80–100)
MONOCYTES ABSOLUTE: 0.3 K/UL (ref 0–1.3)
MONOCYTES RELATIVE PERCENT: 2 %
MRSA SCREEN RT-PCR: NORMAL
NEUTROPHILS ABSOLUTE: 13.7 K/UL (ref 1.7–7.7)
NEUTROPHILS RELATIVE PERCENT: 94.1 %
PDW BLD-RTO: 14.7 % (ref 12.4–15.4)
PLATELET # BLD: 208 K/UL (ref 135–450)
PMV BLD AUTO: 6.9 FL (ref 5–10.5)
POTASSIUM REFLEX MAGNESIUM: 3.9 MMOL/L (ref 3.5–5.1)
RBC # BLD: 3.57 M/UL (ref 4–5.2)
SODIUM BLD-SCNC: 139 MMOL/L (ref 136–145)
WBC # BLD: 14.6 K/UL (ref 4–11)

## 2019-07-22 PROCEDURE — 85025 COMPLETE CBC W/AUTO DIFF WBC: CPT

## 2019-07-22 PROCEDURE — 6360000002 HC RX W HCPCS: Performed by: NURSE PRACTITIONER

## 2019-07-22 PROCEDURE — 94761 N-INVAS EAR/PLS OXIMETRY MLT: CPT

## 2019-07-22 PROCEDURE — 94640 AIRWAY INHALATION TREATMENT: CPT

## 2019-07-22 PROCEDURE — 6370000000 HC RX 637 (ALT 250 FOR IP): Performed by: NURSE PRACTITIONER

## 2019-07-22 PROCEDURE — 36415 COLL VENOUS BLD VENIPUNCTURE: CPT

## 2019-07-22 PROCEDURE — 99232 SBSQ HOSP IP/OBS MODERATE 35: CPT | Performed by: INTERNAL MEDICINE

## 2019-07-22 PROCEDURE — 6370000000 HC RX 637 (ALT 250 FOR IP): Performed by: INTERNAL MEDICINE

## 2019-07-22 PROCEDURE — 2700000000 HC OXYGEN THERAPY PER DAY

## 2019-07-22 PROCEDURE — 94760 N-INVAS EAR/PLS OXIMETRY 1: CPT

## 2019-07-22 PROCEDURE — 2580000003 HC RX 258: Performed by: INTERNAL MEDICINE

## 2019-07-22 PROCEDURE — 6360000002 HC RX W HCPCS: Performed by: INTERNAL MEDICINE

## 2019-07-22 PROCEDURE — 80048 BASIC METABOLIC PNL TOTAL CA: CPT

## 2019-07-22 RX ORDER — THEOPHYLLINE 400 MG/1
200 TABLET, EXTENDED RELEASE ORAL DAILY
Qty: 30 TABLET | Refills: 1 | Status: SHIPPED | OUTPATIENT
Start: 2019-07-23 | End: 2019-08-22

## 2019-07-22 RX ORDER — FUROSEMIDE 20 MG/1
TABLET ORAL
Qty: 30 TABLET | Refills: 1 | Status: SHIPPED | OUTPATIENT
Start: 2019-07-22

## 2019-07-22 RX ORDER — PREDNISONE 20 MG/1
TABLET ORAL
Qty: 30 TABLET | Refills: 0 | Status: SHIPPED | OUTPATIENT
Start: 2019-07-22 | End: 2019-07-22 | Stop reason: SDUPTHER

## 2019-07-22 RX ORDER — PREDNISONE 20 MG/1
TABLET ORAL
Qty: 30 TABLET | Refills: 0 | Status: SHIPPED | OUTPATIENT
Start: 2019-07-22 | End: 2019-08-03

## 2019-07-22 RX ADMIN — IPRATROPIUM BROMIDE AND ALBUTEROL SULFATE 1 AMPULE: .5; 3 SOLUTION RESPIRATORY (INHALATION) at 08:00

## 2019-07-22 RX ADMIN — CLOPIDOGREL BISULFATE 75 MG: 75 TABLET ORAL at 10:21

## 2019-07-22 RX ADMIN — METHYLPREDNISOLONE SODIUM SUCCINATE 40 MG: 40 INJECTION, POWDER, FOR SOLUTION INTRAMUSCULAR; INTRAVENOUS at 04:22

## 2019-07-22 RX ADMIN — ENOXAPARIN SODIUM 30 MG: 30 INJECTION SUBCUTANEOUS at 10:21

## 2019-07-22 RX ADMIN — THEOPHYLLINE 200 MG: 400 TABLET, EXTENDED RELEASE ORAL at 10:20

## 2019-07-22 RX ADMIN — FUROSEMIDE 20 MG: 20 TABLET ORAL at 10:28

## 2019-07-22 RX ADMIN — Medication 10 ML: at 10:29

## 2019-07-22 RX ADMIN — MOMETASONE FUROATE AND FORMOTEROL FUMARATE DIHYDRATE 2 PUFF: 100; 5 AEROSOL RESPIRATORY (INHALATION) at 08:10

## 2019-07-22 RX ADMIN — LEVOFLOXACIN 250 MG: 5 INJECTION, SOLUTION INTRAVENOUS at 10:22

## 2019-07-22 RX ADMIN — Medication 1 CAPSULE: at 10:21

## 2019-07-22 RX ADMIN — METHYLPREDNISOLONE SODIUM SUCCINATE 40 MG: 40 INJECTION, POWDER, FOR SOLUTION INTRAMUSCULAR; INTRAVENOUS at 10:21

## 2019-07-22 RX ADMIN — IPRATROPIUM BROMIDE AND ALBUTEROL SULFATE 1 AMPULE: .5; 3 SOLUTION RESPIRATORY (INHALATION) at 12:54

## 2019-07-22 RX ADMIN — CARVEDILOL 12.5 MG: 12.5 TABLET, FILM COATED ORAL at 10:21

## 2019-07-22 RX ADMIN — PANTOPRAZOLE SODIUM 40 MG: 40 TABLET, DELAYED RELEASE ORAL at 06:41

## 2019-07-22 NOTE — PROGRESS NOTES
IV dressing to L FA noted to have new onset of bleeding; RN removed dressing to find catheter damage and skin tear. IV removed and skin tear dressed with non adherent dressing, dry dressing and al. New IV placed to R FA.  Electronically signed by Ana Mcmanus RN on 7/21/2019 at 10:48 AM
Patient alert and oriented presently. Patient denies shortness of breath presently. VSS and respirations easy. Morning medications given without complication. Will continue to monitor.  Electronically signed by Rowdy Alvarenga RN on 7/21/2019 at 8:36 AM
0. 8     LIVER PROFILE:   Recent Labs     07/20/19  2228   AST 28   ALT 20   BILITOT 0.6   ALKPHOS 61     PT/INR:   Recent Labs     07/20/19  2228   PROTIME 12.3   INR 1.08     APTT:   Recent Labs     07/20/19  2335   APTT 39.0*     UA:No results for input(s): NITRITE, COLORU, PHUR, LABCAST, WBCUA, RBCUA, MUCUS, TRICHOMONAS, YEAST, BACTERIA, CLARITYU, SPECGRAV, LEUKOCYTESUR, UROBILINOGEN, BILIRUBINUR, BLOODU, GLUCOSEU, AMORPHOUS in the last 72 hours. Invalid input(s): Johnson Rinne  No results for input(s): PH, PCO2, PO2 in the last 72 hours. Cx:      Films:      Assessment:     COPD, FEV1 55%, DLCO 39%  Pulmonary nodules  Pulmonary cachexia  Shortness of breath  History of heart failure, grade 1 diastolic dysfunction    Plan:      sob  - feeling a lot better\  - would like to d/c today  - ok to d/c with pred taper over 12 days with theophylline 200 ER. - continue Symbicort, Spiriva   - will have quick follow up and consider chronic steroids. - will get theophylline level then. - she will continue 2-4L NC (4 with exertion)   This note was transcribed using [id number] [employer]. Please disregard any translational errors.       Aiyana Gutierrez Pulmonary, [facility] and [facility] [phone]
Alert and oriented. Resting in bed in no apparent distress. Pleasant and cooperative. Thin. HEENT: Normocephalic. Atraumatic. Pupils equal and reactive. EOM intact. Oral mucosa pink/moist/intact. O2 per NC - wears 2L at baseline  Neck: Supple. Symmetrical. Trachea midline. Lungs: Clear to auscultation bilaterally. Respirations even and unlabored. Chest: Exam unremarkable. Cardiac: S1/S2 noted. Regular Rhythm and rate. Abdomen/GI: Soft. Non-tender. Non-distended. BS+. Extremities: PP+. Atraumatic. No redness/cyanosis/edema noted. Brisk cap refill. Skin: Dry and intact. No lesions noted. Neuro: Grossly intact. No focal deficits noted. LABS:    Lab Results   Component Value Date    WBC 8.8 07/20/2019    HGB 12.2 07/20/2019    HCT 36.7 07/20/2019    MCV 93.9 07/20/2019     07/20/2019    LABLYMP 1.4 06/07/2017    MID 0.5 06/07/2017    GRAN 5.7 06/07/2017    LYMPHOPCT 9.1 07/20/2019    MIDPERCENT 6.5 06/07/2017    GRANULOCYTES 74.7 06/07/2017    RBC 3.90 (L) 07/20/2019    MCH 31.3 07/20/2019    MCHC 33.4 07/20/2019    RDW 14.8 07/20/2019       Lab Results   Component Value Date    CREATININE 0.9 07/20/2019    BUN 22 (H) 07/20/2019     07/20/2019    K 4.2 07/20/2019     07/20/2019    CO2 25 07/20/2019       Lab Results   Component Value Date    MG 2.00 04/23/2018       Lab Results   Component Value Date    ALT 20 07/20/2019    AST 28 07/20/2019    ALKPHOS 61 07/20/2019    BILITOT 0.6 07/20/2019        No flowsheet data found. No results found for: LABA1C    Imaging:  XR CHEST PORTABLE   Final Result   Increased interstitial opacity, along with small bilateral pleural effusions. Though some of that interstitial opacity appears chronic, it does appear   increased, and therefore correlate with clinical evidence of interstitial   edema.              Assessment & Plan:        Acute COPD Exacerbation v worsening of pulmonary disease  Nebulizer treatments  Corticosteroids - IV

## 2019-07-22 NOTE — DISCHARGE SUMMARY
Hospital Medicine Discharge Summary      Patient ID: Sherrill Velasco      Patient's PCP: Cece Weeks MD    Admit Date: 7/20/2019     Discharge Date: 7/22/2019       Admitting Physician: Ozzie Reynoso MD     Discharge Provider: STEWART Hopson CNP     Discharge Diagnoses: Active Hospital Problems    Diagnosis Date Noted    COPD exacerbation ([facility] [address] [J44.1] 07/21/2019       Disposition:  [x] Home  [] Home with home health [] Rehab [] Psych [] SNF  [] LTAC  [] Long term nursing home or group home [] Transfer to ICU  [] Transfer to PCU [] Other:    Cece Weeks MD  [address] [place] [address]  [phone]    Schedule an appointment as soon as possible for a visit in 1 week         Discharge Condition: stable      Code Status:  Full Code     Activity: activity as tolerated    Diet: DIET LOW SODIUM 2 GM; Discharge Medications:     Discharge Medication List as of 7/22/2019  1:22 PM           Details   theophylline (UNIPHYL) 400 MG extended release tablet Take 0.5 tablets by mouth daily, Disp-30 tablet, R-1Normal      predniSONE (DELTASONE) 20 MG tablet Take 3 tablets by mouth daily for 3 days, THEN 2 tablets daily for 3 days, THEN 1 tablet daily for 3 days, THEN 0.5 tablets daily for 3 days. , Disp-30 tablet, R-0Print              Details   nortriptyline (PAMELOR) 10 MG capsule Take 1 capsule by mouth nightly, Disp-90 capsule, R-0REFILL REQUESTED MON 11/12/18 @ 11:24AMNormal      Nutritional Supplements (ENSURE ENLIVE) LIQD One or 2 bottles daily (ok to give whichever brand is covered), Disp-60 Can, R-5Normal      amLODIPine (NORVASC) 10 MG tablet TAKE ONE TABLET BY MOUTH DAILY. , Disp-90 tablet, R-1REFILL REQUESTED 5/13/19 @ 10:33 A. M. DLBNormal      potassium chloride (KLOR-CON M) 20 MEQ extended release tablet TAKE ONE TABLET BY MOUTH EVERY OTHER DAY WITH LASIX (GENERIC NAME IS FUROSEMIDE), Disp-90 tablet, R-3REFILL REQUESTED 12/21/18 @ 10:29 A. Yumiko Maurer

## 2019-07-23 ENCOUNTER — CARE COORDINATION (OUTPATIENT)
Dept: CASE MANAGEMENT | Age: 79
End: 2019-07-23

## 2019-07-23 ENCOUNTER — TELEPHONE (OUTPATIENT)
Dept: PHARMACY | Facility: CLINIC | Age: 79
End: 2019-07-23

## 2019-07-23 ENCOUNTER — TELEPHONE (OUTPATIENT)
Dept: FAMILY MEDICINE CLINIC | Age: 79
End: 2019-07-23

## 2019-07-23 DIAGNOSIS — J44.1 COPD EXACERBATION (HCC): Primary | ICD-10-CM

## 2019-07-23 PROCEDURE — 1111F DSCHRG MED/CURRENT MED MERGE: CPT

## 2019-07-23 RX ORDER — SENNA AND DOCUSATE SODIUM 50; 8.6 MG/1; MG/1
1 TABLET, FILM COATED ORAL 2 TIMES DAILY PRN
COMMUNITY

## 2019-07-23 NOTE — TELEPHONE ENCOUNTER
[facility] Trans[facility] Initial Follow Up Call    Outreach made within 2 business days of discharge: Yes    Patient: Kosta Meyers Patient : 1940   MRN: [id number]  Reason for Admission: There are no discharge diagnoses documented for the most recent discharge. Discharge Date: 19       Spoke with: Patient    Discharge department/facility: [address] [place] [address] Interactive Patient Contact:  Was patient able to fill all prescriptions: Yes  Was patient instructed to bring all medications to the follow-up visit: Yes  Is patient taking all medications as directed in the discharge summary?  Yes  Does patient understand their discharge instructions: Yes  Does patient have questions or concerns that need addressed prior to 7-14 day follow up office visit: no    Scheduled appointment with PCP within 7-14 days    Follow Up  Future Appointments   Date Time Provider Rakesh Jacobson   2019 11:40 AM Bon Moore MD [facility] PUL REBECCA   2019 11:00 AM Karma Gaffney MD [facility]       Khushbu Enmanuel Simon

## 2019-07-23 NOTE — CARE COORDINATION
[facility] Trans[facility] Initial Follow Up Call    Call within 2 business days of discharge: Yes    Patient: Shahbaz Olsen Patient : 1940   MRN: [id number]  Reason for Admission: COPD  Discharge Date: 19 RARS: Readmission Risk Score: 21      Last Discharge [facility]       Complaint Diagnosis Description Type Department Provider    19 Shortness of Breath COPD exacerbation ([address] . .. ED to Hosp-Admission (Discharged) (ADMITTED) Rakesh Quiroz MD; Tristian Casarez Spoke with: no one    Facility: [facility]      Follow Up: Unable to reach patient. Unable to leave message. No answer - no voicemail.   Future Appointments   Date Time Provider Rakesh Jacobson   2019 11:40 AM Elmer Shaver MD [address] [place] [address]   2019 11:00 AM Karma Mena MD [facility]       Sana Swartz RN

## 2019-07-23 NOTE — TELEPHONE ENCOUNTER
MOUTH EVERY OTHER DAY WITH LASIX (GENERIC NAME IS FUROSEMIDE)    ADVAIR -21 MCG/ACT inhaler INHALE TWO PUFFS INTO THE LUNGS TWO TIMES A DAY.  atorvastatin (LIPITOR) 80 MG tablet TAKE ONE TABLET BY MOUTH NIGHTLY    omeprazole (PRILOSEC) 20 MG delayed release capsule TAKE 1 CAPSULE BY MOUTH DAILY    clopidogrel (PLAVIX) 75 MG tablet TAKE ONE TABLET BY MOUTH DAILY.  carvedilol (COREG) 12.5 MG tablet TAKE 1 TABLET BY MOUTH 2 TIMES DAILY (WITH MEALS)    albuterol sulfate HFA (PROAIR HFA) 108 (90 BASE) MCG/ACT inhaler Inhale 2 puffs into the lungs every 6 hours as needed for Wheezing    aspirin 81 MG EC tablet Take 1 tablet by mouth nightly.  Multiple Vitamins-Minerals (CENTRUM SILVER PO) Take 1 tablet by mouth daily. Meds to Beds:No    CrCl cannot be calculated (Unknown ideal weight. ). Assessment/Plan:  - Medication reconciliation completed. Number of medications reviewed: 14    - Pt is taking medications as directed by discharging physician. Number of discrepancies: 0.     - [facility] active medication list updated:  · Medications Added (0)  · Medications Removed (0)  · Medications Changed (1):  Senokot - patient takes prn, updated home med list.     - Identified Potential Medication Interactions: No clinically significant interactions identified via Lexicomp Interaction Analysis as category D or higher.    - Renal Dosing: No renal adjustments necessary.    - Follow up appointment date (7 days for more severe illness, 14 days for others):    · Patient reminded of upcoming appointment with pulmonologist on 8/7/19.      Thank you,    Micheal Vega, PharmD, [address]  Direct: [phone]  Department, toll free [phone], option [phone] Gloria Ryder Call Made?: Yes  [facility] Select Patient?: Yes  Total # of Interventions Recommended: 1 -   - Updated Order #: 1  Total # Interventions Accepted: 1  Intervention Severity:   - Level 1 Intervention Present?: No   - Level 2 #: 0   - Level 3 #: 0  Outreach Status: Review Complete  Care Coordinator Outreach to Patient?: No  Provider Contacted?: No  Time Spent (min): 30

## 2019-07-24 ENCOUNTER — CARE COORDINATION (OUTPATIENT)
Dept: CASE MANAGEMENT | Age: 79
End: 2019-07-24

## 2019-08-01 ENCOUNTER — CARE COORDINATION (OUTPATIENT)
Dept: CARE COORDINATION | Age: 79
End: 2019-08-01

## 2019-08-01 ASSESSMENT — ENCOUNTER SYMPTOMS: DYSPNEA ASSOCIATED WITH: MINIMAL EXERTION

## 2019-08-01 NOTE — CARE COORDINATION
Monitoring   On track     Daily Weights - I will notify my provider of any increase in weight by 3 or more pounds in 2 days OR 5 or more pounds in a week. Patient Reported Weight No flowsheet data found. 82 lbs    Barriers: none  Plan for overcoming my barriers: N/A  Confidence: 9/10  Anticipated Goal Completion Date: permanent              Prior to Admission medications    Medication Sig Start Date End Date Taking? Authorizing Provider   sennosides-docusate sodium (SENOKOT-S) 8.6-50 MG tablet Take 1 tablet by mouth 2 times daily as needed for Constipation    Historical Provider, MD   theophylline (UNIPHYL) 400 MG extended release tablet Take 0.5 tablets by mouth daily 7/23/19 8/22/19  STEWART Harrell CNP   predniSONE (DELTASONE) 20 MG tablet Take 3 tablets by mouth daily for 3 days, THEN 2 tablets daily for 3 days, THEN 1 tablet daily for 3 days, THEN 0.5 tablets daily for 3 days. 7/22/19 8/3/19  STEWART Harrell CNP   furosemide (LASIX) 20 MG tablet TAKE ONE TABLET BY MOUTH EVERY OTHER DAY. 7/22/19   Kerline Maddox MD   nortriptyline (PAMELOR) 10 MG capsule Take 1 capsule by mouth nightly 5/23/19   Merl QuietSTEWART CNP   Nutritional Supplements (ENSURE ENLIVE) LIQD One or 2 bottles daily (ok to give whichever brand is covered) 5/22/19   Kerline Maddox MD   amLODIPine (NORVASC) 10 MG tablet TAKE ONE TABLET BY MOUTH DAILY. 5/13/19   Karma Wood MD   potassium chloride (KLOR-CON M) 20 MEQ extended release tablet TAKE ONE TABLET BY MOUTH EVERY OTHER DAY WITH LASIX (GENERIC NAME IS FUROSEMIDE) 12/21/18   Leslee Bae MD   [facility] 230-21 MCG/ACT inhaler INHALE TWO PUFFS INTO THE LUNGS TWO TIMES A DAY.  11/12/18   Cierra Robertson MD   atorvastatin (LIPITOR) 80 MG tablet TAKE ONE TABLET BY MOUTH NIGHTLY 9/10/18   Cierra Robertson MD   omeprazole (PRILOSEC) 20 MG delayed release capsule TAKE 1 CAPSULE BY MOUTH DAILY 9/10/18   Cierra Robertson MD   clopidogrel (PLAVIX)

## 2019-08-07 ENCOUNTER — OFFICE VISIT (OUTPATIENT)
Dept: PULMONOLOGY | Age: 79
End: 2019-08-07
Payer: MEDICARE

## 2019-08-07 VITALS
SYSTOLIC BLOOD PRESSURE: 120 MMHG | WEIGHT: 76.2 LBS | DIASTOLIC BLOOD PRESSURE: 62 MMHG | BODY MASS INDEX: 14.39 KG/M2 | HEIGHT: 61 IN | HEART RATE: 64 BPM | RESPIRATION RATE: 16 BRPM | TEMPERATURE: 98.1 F

## 2019-08-07 DIAGNOSIS — J96.11 CHRONIC RESPIRATORY FAILURE WITH HYPOXIA (HCC): ICD-10-CM

## 2019-08-07 DIAGNOSIS — R64 PULMONARY CACHEXIA DUE TO CHRONIC OBSTRUCTIVE PULMONARY DISEASE (HCC): ICD-10-CM

## 2019-08-07 DIAGNOSIS — J44.9 PULMONARY CACHEXIA DUE TO CHRONIC OBSTRUCTIVE PULMONARY DISEASE (HCC): ICD-10-CM

## 2019-08-07 DIAGNOSIS — J43.2 CENTRILOBULAR EMPHYSEMA (HCC): Primary | ICD-10-CM

## 2019-08-07 PROBLEM — J44.1 COPD EXACERBATION (HCC): Status: RESOLVED | Noted: 2019-07-21 | Resolved: 2019-08-07

## 2019-08-07 PROCEDURE — G8926 SPIRO NO PERF OR DOC: HCPCS | Performed by: INTERNAL MEDICINE

## 2019-08-07 PROCEDURE — 99214 OFFICE O/P EST MOD 30 MIN: CPT | Performed by: INTERNAL MEDICINE

## 2019-08-07 PROCEDURE — G8427 DOCREV CUR MEDS BY ELIG CLIN: HCPCS | Performed by: INTERNAL MEDICINE

## 2019-08-07 PROCEDURE — 3023F SPIROM DOC REV: CPT | Performed by: INTERNAL MEDICINE

## 2019-08-07 PROCEDURE — 1123F ACP DISCUSS/DSCN MKR DOCD: CPT | Performed by: INTERNAL MEDICINE

## 2019-08-07 PROCEDURE — G8598 ASA/ANTIPLAT THER USED: HCPCS | Performed by: INTERNAL MEDICINE

## 2019-08-07 PROCEDURE — 1111F DSCHRG MED/CURRENT MED MERGE: CPT | Performed by: INTERNAL MEDICINE

## 2019-08-07 PROCEDURE — 1090F PRES/ABSN URINE INCON ASSESS: CPT | Performed by: INTERNAL MEDICINE

## 2019-08-07 PROCEDURE — G8419 CALC BMI OUT NRM PARAM NOF/U: HCPCS | Performed by: INTERNAL MEDICINE

## 2019-08-07 PROCEDURE — 1036F TOBACCO NON-USER: CPT | Performed by: INTERNAL MEDICINE

## 2019-08-07 PROCEDURE — G8399 PT W/DXA RESULTS DOCUMENT: HCPCS | Performed by: INTERNAL MEDICINE

## 2019-08-07 PROCEDURE — 4040F PNEUMOC VAC/ADMIN/RCVD: CPT | Performed by: INTERNAL MEDICINE

## 2019-08-07 RX ORDER — PREDNISONE 10 MG/1
10 TABLET ORAL DAILY
Qty: 30 TABLET | Refills: 3 | Status: SHIPPED | OUTPATIENT
Start: 2019-08-07 | End: 2019-09-06

## 2019-08-07 NOTE — PROGRESS NOTES
Dr. Radha Witt Right 08/2017    Dr. Kendy Maldonado Left 04/21/2018    Dr. Rehana Redman Right 9/15/14    wedge excision RLL nodule - squamous cell    THORACOTOMY Right 9/15/14    Right VATS with wedge excision right lower lung nodule:  Dr. Yelitza Mainoter  age 27    UPPER GASTROINTESTINAL ENDOSCOPY  4-20-15       FAMILY HISTORY:  family history includes Cancer in her mother; Diabetes in her father; Heart Attack in her father; Heart Disease in her mother. SOCIAL HISTORY:   reports that she quit smoking about 6 years ago. Her smoking use included cigarettes. She has a 53.00 pack-year smoking history. She has never used smokeless tobacco.    ALLERGIES:  Patient is allergic to ace inhibitors. REVIEW OF SYSTEMS:  Constitutional: Negative for fever   HENT: Negative for sore throat  Respiratory: ++ for dyspnea, + cough  Cardiovascular: Negative for chest pain  Gastrointestinal: Negative for vomiting, diarrhea   Skin: Negative for rash  Psychiatric/Behavorial: Negative for anxiety     Objective:   PHYSICAL EXAM:  Blood pressure 120/62, pulse 64, temperature 98.1 °F (36.7 °C), temperature source Oral, resp. rate 16, height 5' 1\" (1.549 m), weight 76 lb 3.2 oz (34.6 kg), not currently breastfeeding.'  Gen: No distress. + thin   ENT:  Resp: Distant no accessory muscle use. No crackles. No wheezes. No rhonchi. CV: Regular rate. Regular rhythm. Positive murmur. No edema. Skin: Warm, dry, normal texture and turgor. No nodule on exposed extremities. M/S: No cyanosis. No clubbing. No joint deformity. Psych: Oriented x 3. No anxiety. Awake. Alert. Intact judgement and insight. Good Mood / Affect.   Memory appears in tact     Current Outpatient Medications on File Prior to Visit   Medication Sig Dispense Refill    sennosides-docusate sodium (SENOKOT-S) 8.6-50 MG

## 2019-08-08 ENCOUNTER — CARE COORDINATION (OUTPATIENT)
Dept: CARE COORDINATION | Age: 79
End: 2019-08-08

## 2019-08-08 ASSESSMENT — ENCOUNTER SYMPTOMS: DYSPNEA ASSOCIATED WITH: MINIMAL EXERTION

## 2019-08-12 RX ORDER — NORTRIPTYLINE HYDROCHLORIDE 10 MG/1
10 CAPSULE ORAL NIGHTLY
Qty: 90 CAPSULE | Refills: 0 | Status: SHIPPED | OUTPATIENT
Start: 2019-08-12

## 2019-08-13 RX ORDER — CARVEDILOL 12.5 MG/1
TABLET ORAL
Qty: 180 TABLET | Refills: 3 | Status: SHIPPED | OUTPATIENT
Start: 2019-08-13

## 2019-08-19 ENCOUNTER — OFFICE VISIT (OUTPATIENT)
Dept: FAMILY MEDICINE CLINIC | Age: 79
End: 2019-08-19
Payer: MEDICARE

## 2019-08-19 VITALS
BODY MASS INDEX: 13.97 KG/M2 | HEIGHT: 61 IN | SYSTOLIC BLOOD PRESSURE: 128 MMHG | WEIGHT: 74 LBS | HEART RATE: 72 BPM | DIASTOLIC BLOOD PRESSURE: 63 MMHG

## 2019-08-19 DIAGNOSIS — I70.1 RAS (RENAL ARTERY STENOSIS) (HCC): ICD-10-CM

## 2019-08-19 DIAGNOSIS — C34.12 MALIGNANT NEOPLASM OF UPPER LOBE OF LEFT LUNG (HCC): Primary | ICD-10-CM

## 2019-08-19 DIAGNOSIS — I25.5 ISCHEMIC CARDIOMYOPATHY: ICD-10-CM

## 2019-08-19 DIAGNOSIS — R64 PULMONARY CACHEXIA DUE TO CHRONIC OBSTRUCTIVE PULMONARY DISEASE (HCC): ICD-10-CM

## 2019-08-19 DIAGNOSIS — R63.4 WEIGHT LOSS: ICD-10-CM

## 2019-08-19 DIAGNOSIS — I70.0 OCCLUDED AORTA (HCC): ICD-10-CM

## 2019-08-19 DIAGNOSIS — I10 ESSENTIAL HYPERTENSION, BENIGN: ICD-10-CM

## 2019-08-19 DIAGNOSIS — J43.2 CENTRILOBULAR EMPHYSEMA (HCC): ICD-10-CM

## 2019-08-19 DIAGNOSIS — N18.30 CKD (CHRONIC KIDNEY DISEASE) STAGE 3, GFR 30-59 ML/MIN (HCC): ICD-10-CM

## 2019-08-19 DIAGNOSIS — J96.11 CHRONIC RESPIRATORY FAILURE WITH HYPOXIA (HCC): ICD-10-CM

## 2019-08-19 DIAGNOSIS — I73.9 PAD (PERIPHERAL ARTERY DISEASE) (HCC): ICD-10-CM

## 2019-08-19 DIAGNOSIS — J44.9 PULMONARY CACHEXIA DUE TO CHRONIC OBSTRUCTIVE PULMONARY DISEASE (HCC): ICD-10-CM

## 2019-08-19 DIAGNOSIS — I25.10 CORONARY ARTERY DISEASE INVOLVING NATIVE CORONARY ARTERY OF NATIVE HEART WITHOUT ANGINA PECTORIS: ICD-10-CM

## 2019-08-19 DIAGNOSIS — M81.0 AGE-RELATED OSTEOPOROSIS WITHOUT CURRENT PATHOLOGICAL FRACTURE: ICD-10-CM

## 2019-08-19 DIAGNOSIS — E55.9 VITAMIN D DEFICIENCY: ICD-10-CM

## 2019-08-19 LAB
A/G RATIO: 2.2 (ref 1.1–2.2)
ALBUMIN SERPL-MCNC: 4.3 G/DL (ref 3.4–5)
ALP BLD-CCNC: 61 U/L (ref 40–129)
ALT SERPL-CCNC: 28 U/L (ref 10–40)
ANION GAP SERPL CALCULATED.3IONS-SCNC: 15 MMOL/L (ref 3–16)
AST SERPL-CCNC: 37 U/L (ref 15–37)
BILIRUB SERPL-MCNC: 0.4 MG/DL (ref 0–1)
BUN BLDV-MCNC: 18 MG/DL (ref 7–20)
CALCIUM SERPL-MCNC: 9.4 MG/DL (ref 8.3–10.6)
CHLORIDE BLD-SCNC: 99 MMOL/L (ref 99–110)
CHOLESTEROL, TOTAL: 129 MG/DL (ref 0–199)
CO2: 28 MMOL/L (ref 21–32)
CREAT SERPL-MCNC: 0.8 MG/DL (ref 0.6–1.2)
GFR AFRICAN AMERICAN: >60
GFR NON-AFRICAN AMERICAN: >60
GLOBULIN: 2 G/DL
GLUCOSE BLD-MCNC: 92 MG/DL (ref 70–99)
HDLC SERPL-MCNC: 51 MG/DL (ref 40–60)
LDL CHOLESTEROL CALCULATED: 61 MG/DL
POTASSIUM SERPL-SCNC: 4.3 MMOL/L (ref 3.5–5.1)
SODIUM BLD-SCNC: 142 MMOL/L (ref 136–145)
TOTAL PROTEIN: 6.3 G/DL (ref 6.4–8.2)
TRIGL SERPL-MCNC: 83 MG/DL (ref 0–150)
VITAMIN D 25-HYDROXY: 58.2 NG/ML
VLDLC SERPL CALC-MCNC: 17 MG/DL

## 2019-08-19 PROCEDURE — 1090F PRES/ABSN URINE INCON ASSESS: CPT | Performed by: FAMILY MEDICINE

## 2019-08-19 PROCEDURE — 4040F PNEUMOC VAC/ADMIN/RCVD: CPT | Performed by: FAMILY MEDICINE

## 2019-08-19 PROCEDURE — 3023F SPIROM DOC REV: CPT | Performed by: FAMILY MEDICINE

## 2019-08-19 PROCEDURE — 1036F TOBACCO NON-USER: CPT | Performed by: FAMILY MEDICINE

## 2019-08-19 PROCEDURE — 99215 OFFICE O/P EST HI 40 MIN: CPT | Performed by: FAMILY MEDICINE

## 2019-08-19 PROCEDURE — 1111F DSCHRG MED/CURRENT MED MERGE: CPT | Performed by: FAMILY MEDICINE

## 2019-08-19 PROCEDURE — G8399 PT W/DXA RESULTS DOCUMENT: HCPCS | Performed by: FAMILY MEDICINE

## 2019-08-19 PROCEDURE — G8419 CALC BMI OUT NRM PARAM NOF/U: HCPCS | Performed by: FAMILY MEDICINE

## 2019-08-19 PROCEDURE — 36415 COLL VENOUS BLD VENIPUNCTURE: CPT | Performed by: FAMILY MEDICINE

## 2019-08-19 PROCEDURE — G8598 ASA/ANTIPLAT THER USED: HCPCS | Performed by: FAMILY MEDICINE

## 2019-08-19 PROCEDURE — 1123F ACP DISCUSS/DSCN MKR DOCD: CPT | Performed by: FAMILY MEDICINE

## 2019-08-19 PROCEDURE — G8926 SPIRO NO PERF OR DOC: HCPCS | Performed by: FAMILY MEDICINE

## 2019-08-19 PROCEDURE — G8427 DOCREV CUR MEDS BY ELIG CLIN: HCPCS | Performed by: FAMILY MEDICINE

## 2019-08-19 NOTE — PROGRESS NOTES
PROGRESS NOTE     Braxton Rincon MD  [facility] Eliel Riggs [address] [facility]  [phone] office  [phone] fax    Date of Service:  8/19/2019    Subjective:      Patient ID: Hyacinth Palacios is a 70 y.o. female      CC: lung cancer, emphysema with chronic resp failure, PAD secondary to occluded distal aorta with symptomatic claudication, HTN, CKD with renal artery stenosis, weight loss, CAD s/p stent 0044, diastolic HF, osteoporosis    A highly complex, at risk patient with multiple co-morbidities here for f/u with multiple issues. HPI   Malignant neoplasm of upper lobe of left lung [facility])  Patient diagnosis stage I squamous cell lung cancer, status post VATS with wedge resection in September 2014, with newly formed hypermetabolic nodule the following year. As she was not a candidate for chemo or surgery giving her comorbidities, she received radiation treatment. She is followed by Dr. Kobi Tapia. She last saw his NP on 4/24/2019. According to this note, her recent CT chest showed no interval change. A repeat CT scan was ordered for 5 months. CT chest 4/19/19:  Impression   No significant interval change in distribution of pulmonary nodules as   compared to prior examination, largest within the left upper lobe.       No significant interval change in small right pleural effusion.       Progressive height loss and sclerosis of T4 vertebral body as compared to   prior.  Correlate with symptomatology.             Osteoporosis/osteopenia:  Current pharmacologic therapy and date started fosamax on 5/22/19 (pt declined forteo or any other injectables)      Patient stopped the medication as stated it caused severe constipation. She is adamant that she does not want to try any more medicines. She does understand that her osteoporosis is severe. Last DXA:  5/10/19-  T score at spine -4.0 and lowest hip -5.1.      Current calcium intake is at least 1200 mg/day from  NSTEMI (non-ST elevated myocardial infarction) [facility] [address] [facility]     Occluded distal aorta (HCC)     Osteoporosis     PAD (peripheral artery disease) ([facility]     SEVERE    Pulmonary HTN ([facility] [address] 2017 ECHO = RVSP @ 58 + MILD TR    ZI (renal artery stenosis) ([facility] [facility]     SBO (small bowel obstruction) ([facility] [address] [facility] 2017    2/2 HERNIA  and 18    [facility] 09/15/2014    SCCA 12 mm :  Dr. Stringer Sales : Dee Carrillo  /  (R) WEDGE RESECTION  /  2015 RECURRANCE W/ NORA STEROTATIC RADIOSURGERY    Wears glasses     reading    Weight loss     25 lbs over last 4 yrs       Past Surgical History:   Procedure Laterality Date   Indy Cm Bilateral     Dr. Ron Mckeon Right 2017    Dr. Bladimir Do Left 2018    Dr. Mari Dunlap Right 9/15/14    wedge excision RLL nodule - squamous cell    THORACOTOMY Right 9/15/14    Right VATS with wedge excision right lower lung nodule:  Dr. Kelley Hoover  age 27    UPPER GASTROINTESTINAL ENDOSCOPY  4-20-15       Social History     Tobacco Use    Smoking status: Former Smoker     Packs/day: 1.00     Years: 53.00     Pack years: 53.00     Types: Cigarettes     Last attempt to quit: 9/15/2012     Years since quittin.9    Smokeless tobacco: Never Used    Tobacco comment: abstaining.    Substance Use Topics    Alcohol use: No       Family History   Problem Relation Age of Onset   [facility] Cancer Mother         lymphoma  in 68's    Heart Disease Mother     Heart Attack Father         late onset    Diabetes Father            Review of Systems  Constitutional:  Negative for activity or appetite change, fever or fatigue  HENT:  Negative for congestion,sinus pressure, or rhinorrhea  Eyes:  Negative for eye pain or visual changes  Resp:  +SOB improving since starting daily prednisone  Cardiovascular:

## 2019-09-12 RX ORDER — AMLODIPINE BESYLATE 10 MG/1
TABLET ORAL
Qty: 90 TABLET | Refills: 1 | Status: SHIPPED | OUTPATIENT
Start: 2019-09-12

## 2019-09-12 RX ORDER — DOCUSATE SODIUM AND SENNOSIDES 8.6; 5 MG/1; MG/1
TABLET, FILM COATED ORAL
Qty: 60 TABLET | Refills: 11 | Status: SHIPPED | OUTPATIENT
Start: 2019-09-12

## 2019-09-16 ENCOUNTER — HOSPITAL ENCOUNTER (OUTPATIENT)
Dept: CT IMAGING | Age: 79
Discharge: HOME | End: 2019-09-16
Payer: MEDICARE

## 2019-09-16 DIAGNOSIS — C34.12 MALIGNANT NEOPLASM OF UPPER LOBE OF LEFT LUNG (HCC): ICD-10-CM

## 2019-09-16 PROCEDURE — 71260 CT THORAX DX C+: CPT

## 2019-09-16 PROCEDURE — 6360000004 HC RX CONTRAST MEDICATION: Performed by: INTERNAL MEDICINE

## 2019-09-16 RX ADMIN — IOPAMIDOL 75 ML: 755 INJECTION, SOLUTION INTRAVENOUS at 12:47

## 2019-09-18 ENCOUNTER — TELEPHONE (OUTPATIENT)
Dept: FAMILY MEDICINE CLINIC | Age: 79
End: 2019-09-18

## 2019-09-18 NOTE — TELEPHONE ENCOUNTER
Let her know I think they will do a great job making sure she is not short of breath and not anxious.   They are very caring people    If she is on the fence, I recommend at least meeting them for a consult so she can ask questions

## 2019-10-02 ENCOUNTER — CARE COORDINATION (OUTPATIENT)
Dept: CARE COORDINATION | Age: 79
End: 2019-10-02

## 2019-10-23 ENCOUNTER — TELEPHONE (OUTPATIENT)
Dept: FAMILY MEDICINE CLINIC | Age: 79
End: 2019-10-23